# Patient Record
Sex: FEMALE | Race: WHITE | NOT HISPANIC OR LATINO | Employment: OTHER | URBAN - METROPOLITAN AREA
[De-identification: names, ages, dates, MRNs, and addresses within clinical notes are randomized per-mention and may not be internally consistent; named-entity substitution may affect disease eponyms.]

---

## 2023-08-28 PROBLEM — E05.90 SUBCLINICAL HYPERTHYROIDISM: Status: ACTIVE | Noted: 2023-08-28

## 2023-08-28 PROBLEM — M19.90 ARTHRITIS: Status: ACTIVE | Noted: 2023-08-28

## 2023-08-28 PROBLEM — R07.9 CHEST PAIN: Status: ACTIVE | Noted: 2023-08-28

## 2023-08-28 PROBLEM — K21.9 GERD (GASTROESOPHAGEAL REFLUX DISEASE): Status: ACTIVE | Noted: 2023-08-28

## 2023-08-28 PROBLEM — E04.1 NODULAR THYROID DISEASE: Status: ACTIVE | Noted: 2023-08-28

## 2023-08-28 PROBLEM — E66.9 DIABETES MELLITUS TYPE 2 IN OBESE: Status: ACTIVE | Noted: 2023-08-28

## 2023-08-28 PROBLEM — E78.2 MIXED HYPERLIPIDEMIA: Status: ACTIVE | Noted: 2023-08-28

## 2023-08-28 PROBLEM — J44.9 COPD (CHRONIC OBSTRUCTIVE PULMONARY DISEASE) (MULTI): Status: ACTIVE | Noted: 2023-08-28

## 2023-08-28 PROBLEM — I25.10 CORONARY ARTERIOSCLEROSIS: Status: ACTIVE | Noted: 2023-08-28

## 2023-08-28 PROBLEM — E11.69 DIABETES MELLITUS TYPE 2 IN OBESE: Status: ACTIVE | Noted: 2023-08-28

## 2023-08-28 PROBLEM — I10 HYPERTENSIVE DISORDER: Status: ACTIVE | Noted: 2023-08-28

## 2023-08-28 PROBLEM — E11.9 DIABETES (MULTI): Status: ACTIVE | Noted: 2023-08-28

## 2023-08-28 PROBLEM — Z95.5 PRESENCE OF STENT IN CORONARY ARTERY: Status: ACTIVE | Noted: 2023-08-28

## 2023-08-28 RX ORDER — GLIMEPIRIDE 1 MG/1
TABLET ORAL
COMMUNITY
Start: 2023-08-13

## 2023-08-28 RX ORDER — ROSUVASTATIN CALCIUM 40 MG/1
TABLET, COATED ORAL
COMMUNITY
End: 2024-05-16 | Stop reason: SDUPTHER

## 2023-08-28 RX ORDER — METFORMIN HYDROCHLORIDE 500 MG/1
TABLET ORAL
COMMUNITY

## 2023-08-28 RX ORDER — OMEGA-3 FATTY ACIDS/FISH OIL 300-500 MG
CAPSULE ORAL
COMMUNITY

## 2023-08-28 RX ORDER — ASPIRIN 81 MG/1
TABLET ORAL
COMMUNITY

## 2023-08-28 RX ORDER — DILTIAZEM HYDROCHLORIDE 240 MG/1
CAPSULE, COATED, EXTENDED RELEASE ORAL
COMMUNITY

## 2023-08-28 RX ORDER — HYDROCHLOROTHIAZIDE 25 MG/1
TABLET ORAL
COMMUNITY

## 2023-08-28 RX ORDER — ATORVASTATIN CALCIUM 40 MG/1
TABLET, FILM COATED ORAL
COMMUNITY
End: 2024-04-30 | Stop reason: DRUGHIGH

## 2023-08-28 RX ORDER — CLOPIDOGREL BISULFATE 75 MG/1
TABLET ORAL
COMMUNITY

## 2023-08-28 RX ORDER — ISOSORBIDE MONONITRATE 120 MG/1
TABLET, EXTENDED RELEASE ORAL
COMMUNITY
Start: 2023-05-16

## 2023-08-28 RX ORDER — CARVEDILOL 3.12 MG/1
TABLET ORAL
COMMUNITY

## 2023-08-28 RX ORDER — ACETAMINOPHEN 500 MG
TABLET ORAL
COMMUNITY
Start: 2019-03-07

## 2023-08-28 RX ORDER — PANTOPRAZOLE SODIUM 40 MG/1
TABLET, DELAYED RELEASE ORAL
COMMUNITY
Start: 2022-11-04

## 2023-08-28 RX ORDER — TRIAMCINOLONE ACETONIDE 1 MG/G
CREAM TOPICAL
COMMUNITY

## 2023-08-28 RX ORDER — NITROFURANTOIN 25; 75 MG/1; MG/1
CAPSULE ORAL
COMMUNITY

## 2023-08-28 RX ORDER — FUROSEMIDE 20 MG/1
TABLET ORAL
COMMUNITY

## 2023-08-28 RX ORDER — NITROGLYCERIN 0.4 MG/1
TABLET SUBLINGUAL
COMMUNITY
Start: 2023-04-24

## 2023-08-28 RX ORDER — ACETAMINOPHEN 500 MG/1
CAPSULE, LIQUID FILLED ORAL
COMMUNITY

## 2023-08-28 RX ORDER — ATORVASTATIN CALCIUM 80 MG/1
TABLET, FILM COATED ORAL
COMMUNITY

## 2023-08-28 RX ORDER — ATENOLOL 25 MG/1
TABLET ORAL
COMMUNITY

## 2023-08-28 RX ORDER — POTASSIUM CHLORIDE 20 MEQ/1
TABLET, EXTENDED RELEASE ORAL
COMMUNITY
Start: 2023-06-11

## 2023-08-28 RX ORDER — UBIDECARENONE 50 MG
CAPSULE ORAL
COMMUNITY
Start: 2019-03-07

## 2023-08-28 RX ORDER — METHIMAZOLE 5 MG/1
TABLET ORAL
COMMUNITY
End: 2023-12-06

## 2023-08-28 RX ORDER — ALBUTEROL SULFATE 90 UG/1
AEROSOL, METERED RESPIRATORY (INHALATION)
COMMUNITY

## 2023-08-28 RX ORDER — GABAPENTIN 300 MG/1
CAPSULE ORAL
COMMUNITY
Start: 2023-07-24 | End: 2025-04-30

## 2023-08-28 RX ORDER — AA/PROT/LYSINE/METHIO/VIT C/B6 50-12.5 MG
TABLET ORAL
COMMUNITY
Start: 2019-03-07

## 2023-08-28 RX ORDER — FLUTICASONE FUROATE AND VILANTEROL 200; 25 UG/1; UG/1
POWDER RESPIRATORY (INHALATION)
COMMUNITY

## 2023-08-29 ENCOUNTER — HOSPITAL ENCOUNTER (OUTPATIENT)
Dept: DATA CONVERSION | Facility: HOSPITAL | Age: 73
Discharge: HOME | End: 2023-08-29
Payer: MEDICARE

## 2023-08-29 DIAGNOSIS — R82.81 PYURIA: ICD-10-CM

## 2023-08-29 LAB
AMOXICILLIN+CLAV SUSC ISLT: NORMAL
AMOXICILLIN+CLAV SUSC ISLT: NORMAL
AMPICILLIN SUSC ISLT: NORMAL
AMPICILLIN SUSC ISLT: NORMAL
AMPICILLIN+SULBAC SUSC ISLT: NORMAL
AMPICILLIN+SULBAC SUSC ISLT: NORMAL
BACTERIA ISLT: NORMAL
BACTERIA ISLT: NORMAL
BACTERIA SPEC CULT: NORMAL
CC # UR: NORMAL /UL
CEFAZOLIN SUSC ISLT: NORMAL
CEFAZOLIN SUSC ISLT: NORMAL
CIPROFLOXACIN SUSC ISLT: NORMAL
CIPROFLOXACIN SUSC ISLT: NORMAL
GENTAMICIN ISLT MLC: NORMAL
GENTAMICIN ISLT MLC: NORMAL
LEVOFLOXACIN SUSC ISLT: NORMAL
LEVOFLOXACIN SUSC ISLT: NORMAL
MEROPENEM SUSC ISLT: NORMAL
MEROPENEM SUSC ISLT: NORMAL
MIC (SUSCEPTIBILITY): NORMAL
MIC (SUSCEPTIBILITY): NORMAL
NITROFURANTOIN SUSC ISLT: NORMAL
NITROFURANTOIN SUSC ISLT: NORMAL
PIP+TAZO SUSC ISLT: NORMAL
PIP+TAZO SUSC ISLT: NORMAL
REPORT STATUS -LH SQ DATA CONVERSION: NORMAL
SERVICE CMNT-IMP: NORMAL
SPECIMEN SOURCE: NORMAL
TETRACYCLINE SUSC ISLT: NORMAL
TETRACYCLINE SUSC ISLT: NORMAL
TMP SMX SUSC ISLT: NORMAL
TMP SMX SUSC ISLT: NORMAL

## 2023-09-19 ENCOUNTER — HOSPITAL ENCOUNTER (OUTPATIENT)
Dept: DATA CONVERSION | Facility: HOSPITAL | Age: 73
Discharge: HOME | End: 2023-09-19
Payer: MEDICARE

## 2023-09-19 DIAGNOSIS — R82.81 PYURIA: ICD-10-CM

## 2023-09-19 LAB
AMOXICILLIN+CLAV SUSC ISLT: NORMAL
AMPICILLIN SUSC ISLT: NORMAL
AMPICILLIN+SULBAC SUSC ISLT: NORMAL
BACTERIA ISLT: NORMAL
BACTERIA SPEC CULT: NORMAL
CC # UR: NORMAL /UL
CEFAZOLIN SUSC ISLT: NORMAL
CIPROFLOXACIN SUSC ISLT: NORMAL
GENTAMICIN ISLT MLC: NORMAL
LEVOFLOXACIN SUSC ISLT: NORMAL
MEROPENEM SUSC ISLT: NORMAL
MIC (SUSCEPTIBILITY): NORMAL
NITROFURANTOIN SUSC ISLT: NORMAL
PIP+TAZO SUSC ISLT: NORMAL
REPORT STATUS -LH SQ DATA CONVERSION: NORMAL
SERVICE CMNT-IMP: NORMAL
SPECIMEN SOURCE: NORMAL
TETRACYCLINE SUSC ISLT: NORMAL
TMP SMX SUSC ISLT: NORMAL

## 2023-10-16 ENCOUNTER — LAB (OUTPATIENT)
Dept: LAB | Facility: LAB | Age: 73
End: 2023-10-16
Payer: MEDICARE

## 2023-10-16 DIAGNOSIS — E11.8 TYPE 2 DIABETES MELLITUS WITH UNSPECIFIED COMPLICATIONS (MULTI): Primary | ICD-10-CM

## 2023-10-16 LAB
EST. AVERAGE GLUCOSE BLD GHB EST-MCNC: 137 MG/DL
HBA1C MFR BLD: 6.4 %

## 2023-10-16 PROCEDURE — 36415 COLL VENOUS BLD VENIPUNCTURE: CPT

## 2023-10-16 PROCEDURE — 83036 HEMOGLOBIN GLYCOSYLATED A1C: CPT

## 2023-10-17 ENCOUNTER — LAB REQUISITION (OUTPATIENT)
Dept: LAB | Facility: HOSPITAL | Age: 73
End: 2023-10-17
Payer: MEDICARE

## 2023-10-17 DIAGNOSIS — R82.81 PYURIA: ICD-10-CM

## 2023-10-17 PROCEDURE — 87086 URINE CULTURE/COLONY COUNT: CPT

## 2023-10-19 LAB — BACTERIA UR CULT: ABNORMAL

## 2023-10-24 ENCOUNTER — ANCILLARY PROCEDURE (OUTPATIENT)
Dept: RADIOLOGY | Facility: CLINIC | Age: 73
End: 2023-10-24
Payer: MEDICARE

## 2023-10-24 ENCOUNTER — LAB (OUTPATIENT)
Dept: LAB | Facility: LAB | Age: 73
End: 2023-10-24
Payer: MEDICARE

## 2023-10-24 DIAGNOSIS — E04.1 NONTOXIC SINGLE THYROID NODULE: ICD-10-CM

## 2023-10-24 DIAGNOSIS — E04.1 NONTOXIC SINGLE THYROID NODULE: Primary | ICD-10-CM

## 2023-10-24 LAB — TSH SERPL DL<=0.05 MIU/L-ACNC: 0.57 MIU/L (ref 0.27–4.2)

## 2023-10-24 PROCEDURE — 76536 US EXAM OF HEAD AND NECK: CPT

## 2023-10-24 PROCEDURE — 36415 COLL VENOUS BLD VENIPUNCTURE: CPT

## 2023-10-24 PROCEDURE — 84443 ASSAY THYROID STIM HORMONE: CPT

## 2023-10-31 NOTE — PROGRESS NOTES
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         April 2022 thyroid u/s: R>L         -R 2.7cm complex nodule         -R 1.2cm complex nodule         - R 1.8cm comples nodule         -L 0.7cm solid nodule         -L 1.4cm solid nodule         -TSH-0.01, ft4-1.4 in April 2022                 Since last visit:         -taking methimazole 5mg qd, thyroid labs tsh-0.57         -pt euthyroid         -no obstructive goiter sx          Thyroid ultrasound oct 2023:  -R 2.2, R 1.3 (similar), L sub cm nodules, isthmus 1.1cm  -nodules graded TR2      Current Outpatient Medications:     acetaminophen (Tylenol) 500 mg capsule, 1 capsule as needed Orally every 8 hours, Disp: , Rfl:     albuterol (Proventil HFA) 90 mcg/actuation inhaler, 2 puffs Inhaler every 4 hours, Disp: , Rfl:     aspirin 81 mg EC tablet, 1 tablet Orally Once a day, Disp: , Rfl:     atorvastatin (Lipitor) 80 mg tablet, 1 tablet Orally Once a day for 30 day(s), Disp: , Rfl:     carvedilol (Coreg) 3.125 mg tablet, 1 tablet with food Orally Twice a day, Disp: , Rfl:     cetirizine HCl (ZYRTEC ORAL), Zyrtec, Disp: , Rfl:     clopidogrel (Plavix) 75 mg tablet, 1 tablet Orally Once a day, Disp: , Rfl:     coenzyme Q-10 10 mg capsule, TAKE 1 CAPSULE 2-3 TIMES DAILY AS DIRECTED., Disp: , Rfl:     dilTIAZem CD (Cartia XT) 240 mg 24 hr capsule, 1 capsule Orally Once a day for 90, Disp: , Rfl:     glimepiride (Amaryl) 1 mg tablet, TAKE 1 TABLET BY MOUTH EVERY DAY WITH BREAKFAST, Disp: , Rfl:     hydroCHLOROthiazide (HYDRODiuril) 25 mg tablet, 1 tablet in the morning Orally Once a day for 90, Disp: , Rfl:     isosorbide mononitrate ER (Imdur) 120 mg 24 hr tablet, 1 tablet in the morning Orally Once a day for 90, Disp: , Rfl:     metFORMIN (Glucophage) 500 mg tablet, 1 tablet with meals Orally Twice a day, Disp: , Rfl:     nitroglycerin (Nitrostat) 0.4 mg SL tablet, 1  tablet Sublingual prn for chest pain for 30 days, Disp: , Rfl:     omega-3 fatty acids-fish oil (Fish OiL) 300-500 mg capsule, 1 capsule Orally daily, Disp: , Rfl:     pantoprazole (ProtoNix) 40 mg EC tablet, 1 tablet Orally Once a day for 30 day(s), Disp: , Rfl:     potassium chloride CR (Klor-Con M20) 20 mEq ER tablet, TAKE 1 TABLET BY MOUTH TWICE A DAY, Disp: , Rfl:     atenolol (Tenormin) 25 mg tablet, TAKE 1 TABLET DAILY., Disp: , Rfl:     atorvastatin (Lipitor) 40 mg tablet, TAKE 1 TABLET AT BEDTIME., Disp: , Rfl:     calcium carbonate-vitamin D3 (Caltrate with Vitamin D3) 600 mg-20 mcg (800 unit) tablet, Take 1 tablet daily, Disp: , Rfl:     calcium carbonate/vitamin D2 (CALCIUM CARBONATE-VITAMIN D PO), Calcium Carbonate-Vitamin D, Disp: , Rfl:     empagliflozin (Jardiance) 10 mg, 1 tablet Orally Once a day for 30 day(s), Disp: , Rfl:     fexofenadine HCl (ALLEGRA ORAL), TAKE 1 CAPSULE TWICE DAILY AS NEEDED., Disp: , Rfl:     fluticasone furoate-vilanteroL (Breo Ellipta) 200-25 mcg/dose inhaler, Inhalation Once a day, Disp: , Rfl:     fluticasone propionate (FLONASE NASL), Flonase, Disp: , Rfl:     furosemide (Lasix) 20 mg tablet, TAKE 1 TABLET DAILY., Disp: , Rfl:     gabapentin (Neurontin) 300 mg capsule, Take 1 capsule by mouth three times daily for 90 days., Disp: , Rfl:     methIMAzole (Tapazole) 5 mg tablet, TAKE 1 TABLET BY MOUTH EVERY DAY for 90 days, Disp: , Rfl:     nitrofurantoin, macrocrystal-monohydrate, (Macrobid) 100 mg capsule, 1 capsule with food Orally every 12 hrs, Disp: , Rfl:     NUTRITIONAL SUPPLEMENTS ORAL, Azo- taking twice a day, Disp: , Rfl:     red yeast rice 600 mg tablet, TAKE TABLET, Disp: , Rfl:     rosuvastatin (Crestor) 40 mg tablet, TAKE 1 TABLET BY MOUTH EVERY DAY FOR 90 DAYS for 90, Disp: , Rfl:     triamcinolone (Kenalog) 0.1 % cream, Kenalog Cream 0.1%, Disp: , Rfl:    Review of Systems:  Cardiology: Lightheadedness-denies.  Chest pain-denies.  Leg edema-denies.   Palpitations-denies.  Respiratory: Cough-denies. Shortness of breath-chronic.  Wheezing-denies.  Gastroenterology: Constipation-denies.  Diarrhea-denies.  Heartburn-denies.  Endocrinology: Cold intolerance-positive.  Heat intolerance-positive.  Sweats-denies.  Neurology: Headache-denies.  Tremor-denies.  Neuropathy in extremities-denies.  Psychology: Low energy-positive.  Irritability-denies.  Sleep disturbances-denies.    Labs: see hpi    /67   Pulse 84   Wt 67.4 kg (148 lb 9.6 oz)   BMI 25.51 kg/m²       Physical Exam:  General Appearance: pleasant, cooperative, no acute distress  HEENT: no chemosis, no proptosis, no lid lag, no lid retraction  Neck: no lymphadenopathy, no thyromegaly, no dominant thyroid nodules  Heart: no murmurs, regular rate and rhythm, S1 and S2  Lungs: no wheezes, no rhonci, no rales  Extremities: no lower extremity swelling    Assessment and Plan:  1. Subclinical hyperthyroidism  -continue methimazole 5mg every day long term  -follow labs q 6 months  2. Nodular thyroid  -fairly stable on serial ultrasound, no concerning looking nodules  -follow with exam, reflex to ultrasound if exam changes      Follow Up:  Jonna 6 months    -labs/tests/notes reviewed  -reviewed and counseled patient on medication monitoring and side effects

## 2023-11-02 ENCOUNTER — OFFICE VISIT (OUTPATIENT)
Dept: ENDOCRINOLOGY | Facility: CLINIC | Age: 73
End: 2023-11-02
Payer: MEDICARE

## 2023-11-02 VITALS
DIASTOLIC BLOOD PRESSURE: 67 MMHG | HEART RATE: 84 BPM | BODY MASS INDEX: 25.51 KG/M2 | SYSTOLIC BLOOD PRESSURE: 122 MMHG | WEIGHT: 148.6 LBS

## 2023-11-02 DIAGNOSIS — E05.90 SUBCLINICAL HYPERTHYROIDISM: Primary | ICD-10-CM

## 2023-11-02 PROCEDURE — 3044F HG A1C LEVEL LT 7.0%: CPT | Performed by: INTERNAL MEDICINE

## 2023-11-02 PROCEDURE — 1036F TOBACCO NON-USER: CPT | Performed by: INTERNAL MEDICINE

## 2023-11-02 PROCEDURE — 3074F SYST BP LT 130 MM HG: CPT | Performed by: INTERNAL MEDICINE

## 2023-11-02 PROCEDURE — 1125F AMNT PAIN NOTED PAIN PRSNT: CPT | Performed by: INTERNAL MEDICINE

## 2023-11-02 PROCEDURE — 1159F MED LIST DOCD IN RCRD: CPT | Performed by: INTERNAL MEDICINE

## 2023-11-02 PROCEDURE — 3078F DIAST BP <80 MM HG: CPT | Performed by: INTERNAL MEDICINE

## 2023-11-02 PROCEDURE — 99213 OFFICE O/P EST LOW 20 MIN: CPT | Performed by: INTERNAL MEDICINE

## 2023-11-02 ASSESSMENT — PATIENT HEALTH QUESTIONNAIRE - PHQ9
2. FEELING DOWN, DEPRESSED OR HOPELESS: NOT AT ALL
1. LITTLE INTEREST OR PLEASURE IN DOING THINGS: NOT AT ALL
SUM OF ALL RESPONSES TO PHQ9 QUESTIONS 1 & 2: 0

## 2023-11-02 ASSESSMENT — ENCOUNTER SYMPTOMS
OCCASIONAL FEELINGS OF UNSTEADINESS: 0
LOSS OF SENSATION IN FEET: 0
DEPRESSION: 0

## 2023-11-02 ASSESSMENT — PAIN SCALES - GENERAL: PAINLEVEL: 1

## 2023-11-03 ENCOUNTER — ANCILLARY PROCEDURE (OUTPATIENT)
Dept: RADIOLOGY | Facility: CLINIC | Age: 73
End: 2023-11-03
Payer: MEDICARE

## 2023-11-03 VITALS — BODY MASS INDEX: 27.42 KG/M2 | HEIGHT: 62 IN | WEIGHT: 149 LBS

## 2023-11-03 DIAGNOSIS — Z12.31 ENCOUNTER FOR SCREENING MAMMOGRAM FOR MALIGNANT NEOPLASM OF BREAST: ICD-10-CM

## 2023-11-03 PROCEDURE — 77063 BREAST TOMOSYNTHESIS BI: CPT

## 2023-11-16 ENCOUNTER — LAB (OUTPATIENT)
Dept: LAB | Facility: LAB | Age: 73
End: 2023-11-16
Payer: MEDICARE

## 2023-11-16 DIAGNOSIS — E11.8 TYPE 2 DIABETES MELLITUS WITH UNSPECIFIED COMPLICATIONS (MULTI): Primary | ICD-10-CM

## 2023-11-16 LAB
ANION GAP SERPL CALC-SCNC: 17 MMOL/L
BUN SERPL-MCNC: 14 MG/DL (ref 8–25)
CALCIUM SERPL-MCNC: 9.4 MG/DL (ref 8.5–10.4)
CHLORIDE SERPL-SCNC: 102 MMOL/L (ref 97–107)
CO2 SERPL-SCNC: 26 MMOL/L (ref 24–31)
CREAT SERPL-MCNC: 0.7 MG/DL (ref 0.4–1.6)
ERYTHROCYTE [DISTWIDTH] IN BLOOD BY AUTOMATED COUNT: 18.1 % (ref 11.5–14.5)
EST. AVERAGE GLUCOSE BLD GHB EST-MCNC: 126 MG/DL
GFR SERPL CREATININE-BSD FRML MDRD: >90 ML/MIN/1.73M*2
GLUCOSE SERPL-MCNC: 115 MG/DL (ref 65–99)
HBA1C MFR BLD: 6 %
HCT VFR BLD AUTO: 37.8 % (ref 36–46)
HGB BLD-MCNC: 11.1 G/DL (ref 12–16)
MCH RBC QN AUTO: 24.3 PG (ref 26–34)
MCHC RBC AUTO-ENTMCNC: 29.4 G/DL (ref 32–36)
MCV RBC AUTO: 83 FL (ref 80–100)
NRBC BLD-RTO: 0 /100 WBCS (ref 0–0)
PLATELET # BLD AUTO: 333 X10*3/UL (ref 150–450)
POTASSIUM SERPL-SCNC: 3.5 MMOL/L (ref 3.4–5.1)
RBC # BLD AUTO: 4.56 X10*6/UL (ref 4–5.2)
SODIUM SERPL-SCNC: 145 MMOL/L (ref 133–145)
WBC # BLD AUTO: 9.2 X10*3/UL (ref 4.4–11.3)

## 2023-11-16 PROCEDURE — 80048 BASIC METABOLIC PNL TOTAL CA: CPT

## 2023-11-16 PROCEDURE — 83036 HEMOGLOBIN GLYCOSYLATED A1C: CPT

## 2023-11-16 PROCEDURE — 36415 COLL VENOUS BLD VENIPUNCTURE: CPT

## 2023-11-16 PROCEDURE — 85027 COMPLETE CBC AUTOMATED: CPT

## 2023-12-06 DIAGNOSIS — E04.1 NONTOXIC SINGLE THYROID NODULE: ICD-10-CM

## 2023-12-06 RX ORDER — METHIMAZOLE 5 MG/1
5 TABLET ORAL DAILY
Qty: 90 TABLET | Refills: 1 | Status: SHIPPED | OUTPATIENT
Start: 2023-12-06 | End: 2024-05-31

## 2023-12-11 PROBLEM — I73.9 PAD (PERIPHERAL ARTERY DISEASE) (CMS-HCC): Status: ACTIVE | Noted: 2018-01-15

## 2023-12-11 PROBLEM — E78.5 HYPERLIPIDEMIA: Status: ACTIVE | Noted: 2023-08-28

## 2023-12-11 PROBLEM — I25.10 CORONARY ARTERY DISEASE INVOLVING NATIVE CORONARY ARTERY OF NATIVE HEART WITHOUT ANGINA PECTORIS: Status: ACTIVE | Noted: 2018-10-11

## 2023-12-11 PROBLEM — R00.2 PALPITATIONS: Status: ACTIVE | Noted: 2020-08-20

## 2023-12-11 PROBLEM — E78.1 PURE HYPERGLYCERIDEMIA: Status: ACTIVE | Noted: 2019-04-01

## 2023-12-11 PROBLEM — Z95.9 S/P ARTERIAL STENT: Status: ACTIVE | Noted: 2018-03-01

## 2023-12-11 PROBLEM — I82.409 DVT (DEEP VENOUS THROMBOSIS) (MULTI): Status: ACTIVE | Noted: 2019-08-23

## 2023-12-11 PROBLEM — R06.02 SOB (SHORTNESS OF BREATH): Status: ACTIVE | Noted: 2018-01-08

## 2023-12-11 PROBLEM — R79.89 ABNORMAL TSH: Status: ACTIVE | Noted: 2018-09-20

## 2023-12-11 PROBLEM — K22.70 BARRETT'S ESOPHAGUS WITHOUT DYSPLASIA: Status: ACTIVE | Noted: 2017-12-27

## 2023-12-11 PROBLEM — E78.00 PURE HYPERCHOLESTEROLEMIA: Status: ACTIVE | Noted: 2019-08-23

## 2023-12-11 PROBLEM — L43.9 LICHEN PLANUS: Status: ACTIVE | Noted: 2018-10-04

## 2023-12-11 PROBLEM — E11.9 DIABETES MELLITUS TYPE 2, CONTROLLED, WITHOUT COMPLICATIONS (MULTI): Status: ACTIVE | Noted: 2023-08-28

## 2023-12-11 PROBLEM — M53.9 BACK PROBLEM: Status: ACTIVE | Noted: 2019-08-23

## 2023-12-11 PROBLEM — J44.9 STAGE 1 MILD COPD BY GOLD CLASSIFICATION (MULTI): Status: ACTIVE | Noted: 2019-08-23

## 2023-12-11 PROBLEM — I20.89 OTHER FORMS OF ANGINA PECTORIS (CMS-HCC): Status: ACTIVE | Noted: 2020-04-15

## 2023-12-11 PROBLEM — E11.8 DM TYPE 2, CONTROLLED, WITH COMPLICATION (MULTI): Status: ACTIVE | Noted: 2020-04-02

## 2023-12-11 PROBLEM — I65.29 CAROTID STENOSIS: Status: ACTIVE | Noted: 2018-08-14

## 2023-12-11 PROBLEM — E11.8 DM TYPE 2, CONTROLLED, WITH COMPLICATION (MULTI): Status: ACTIVE | Noted: 2019-08-23

## 2023-12-11 PROBLEM — R33.9 INCOMPLETE EMPTYING OF BLADDER: Status: ACTIVE | Noted: 2023-10-23

## 2023-12-11 RX ORDER — TAMSULOSIN HYDROCHLORIDE 0.4 MG/1
0.4 CAPSULE ORAL DAILY
COMMUNITY

## 2023-12-11 RX ORDER — LINEZOLID 600 MG/1
600 TABLET, FILM COATED ORAL 2 TIMES DAILY
COMMUNITY
Start: 2023-07-24 | End: 2023-07-31

## 2023-12-11 RX ORDER — BUDESONIDE, GLYCOPYRROLATE, AND FORMOTEROL FUMARATE 160; 9; 4.8 UG/1; UG/1; UG/1
2 AEROSOL, METERED RESPIRATORY (INHALATION) 2 TIMES DAILY
COMMUNITY
Start: 2023-10-23

## 2023-12-11 RX ORDER — CALCIUM CARBONATE/SIMETHICONE 1000-60 MG
1 TABLET,CHEWABLE ORAL EVERY 6 HOURS PRN
COMMUNITY
Start: 2023-01-10

## 2023-12-13 ENCOUNTER — OFFICE VISIT (OUTPATIENT)
Dept: CARDIOLOGY | Facility: CLINIC | Age: 73
End: 2023-12-13
Payer: MEDICARE

## 2023-12-13 VITALS
HEIGHT: 64 IN | DIASTOLIC BLOOD PRESSURE: 68 MMHG | OXYGEN SATURATION: 97 % | WEIGHT: 150.6 LBS | SYSTOLIC BLOOD PRESSURE: 129 MMHG | HEART RATE: 76 BPM | TEMPERATURE: 98.9 F | BODY MASS INDEX: 25.71 KG/M2 | RESPIRATION RATE: 18 BRPM

## 2023-12-13 DIAGNOSIS — E78.2 MIXED HYPERLIPIDEMIA: ICD-10-CM

## 2023-12-13 DIAGNOSIS — R07.9 CHEST PAIN IN ADULT: Primary | ICD-10-CM

## 2023-12-13 DIAGNOSIS — I10 ESSENTIAL HYPERTENSION: ICD-10-CM

## 2023-12-13 DIAGNOSIS — I25.10 CORONARY ARTERY DISEASE INVOLVING NATIVE CORONARY ARTERY OF NATIVE HEART WITHOUT ANGINA PECTORIS: ICD-10-CM

## 2023-12-13 DIAGNOSIS — R00.2 PALPITATIONS: ICD-10-CM

## 2023-12-13 PROCEDURE — 1126F AMNT PAIN NOTED NONE PRSNT: CPT | Performed by: INTERNAL MEDICINE

## 2023-12-13 PROCEDURE — 3074F SYST BP LT 130 MM HG: CPT | Performed by: INTERNAL MEDICINE

## 2023-12-13 PROCEDURE — 3044F HG A1C LEVEL LT 7.0%: CPT | Performed by: INTERNAL MEDICINE

## 2023-12-13 PROCEDURE — 1159F MED LIST DOCD IN RCRD: CPT | Performed by: INTERNAL MEDICINE

## 2023-12-13 PROCEDURE — 3078F DIAST BP <80 MM HG: CPT | Performed by: INTERNAL MEDICINE

## 2023-12-13 PROCEDURE — 99214 OFFICE O/P EST MOD 30 MIN: CPT | Performed by: INTERNAL MEDICINE

## 2023-12-13 PROCEDURE — 1036F TOBACCO NON-USER: CPT | Performed by: INTERNAL MEDICINE

## 2023-12-13 RX ORDER — REGADENOSON 0.08 MG/ML
0.4 INJECTION, SOLUTION INTRAVENOUS
Status: CANCELLED | OUTPATIENT
Start: 2023-12-13

## 2023-12-13 ASSESSMENT — PAIN SCALES - GENERAL: PAINLEVEL: 0-NO PAIN

## 2023-12-13 ASSESSMENT — PATIENT HEALTH QUESTIONNAIRE - PHQ9
2. FEELING DOWN, DEPRESSED OR HOPELESS: NOT AT ALL
SUM OF ALL RESPONSES TO PHQ9 QUESTIONS 1 AND 2: 0
1. LITTLE INTEREST OR PLEASURE IN DOING THINGS: NOT AT ALL

## 2023-12-13 ASSESSMENT — ENCOUNTER SYMPTOMS
OCCASIONAL FEELINGS OF UNSTEADINESS: 0
DEPRESSION: 0
LOSS OF SENSATION IN FEET: 0

## 2023-12-13 NOTE — PROGRESS NOTES
History of present illness:  73 year old female patient here today following up on hx of CAD status post PCI to RAMUS and bifurcation, LCX complicated by dissection of one branch, small NSTEMI, she had failed  of RCA at . (CCS Class II and NYHA Class II-III). MCT monitor was overall normal with no major arrhythmias. Cardiac catheterization on 12/22/2020 status post PCI to LCX with WENDY. 2D Echo on 01/11/2021 showed mild MR with EF of 50-55%. Patient was hospitalized in December 2022 with shortness of breath secondary chronic obstructive pulmonary disease exacerbation. Was found to have pulmonary emphysema with moderate obstructive lung disease. Her workup from cardiac standpoint was stable with normal troponin. 2D echo showed ejection fraction of 50% with mild aortic stenosis in December 2022.  Patient returns to my office for follow-up.  Still complaining of shortness of breath most of the time even at rest.  Reports occasional chest pain.  Happening randomly not always related to activity.  Patient denies having lower extremity edema orthopnea or PND.    Past Medical History:   Diagnosis Date    Carotid stenosis 08/14/2018    Formatting of this note might be different from the original. Per [facility] office visit 8-13-18    Coronary artery disease involving native coronary artery of native heart without angina pectoris 10/11/2018    Diabetes mellitus type 2, controlled, without complications (CMS/[facility]) 08/28/2023    DVT (deep venous thrombosis) (CMS/[facility]) 08/23/2019    Formatting of this note might be different from the original. H/o DVT 2008 per [facility] office visit 8-19-19    Essential hypertension 08/28/2023    Hyperlipidemia 08/28/2023    Other forms of angina pectoris 04/15/2020    Formatting of this note might be different from the original. Per Dr. Blank office visit 4-6-20    PAD (peripheral artery disease) (CMS/[facility]) 01/15/2018    Formatting of this note might be different from the original. Per [facility] office  visit 8-19-19    Palpitations 08/20/2020    Formatting of this note might be different from the original. Per Dr. Blank office visit 8-12-20    Personal history of other diseases of the circulatory system 03/11/2019    History of peripheral arterial disease    Personal history of other endocrine, nutritional and metabolic disease 03/11/2019    History of diabetes mellitus    Personal history of transient ischemic attack (TIA), and cerebral infarction without residual deficits 02/19/2019    History of cerebrovascular accident    Presence of stent in coronary artery 08/28/2023    Pure hypercholesterolemia 08/23/2019    Formatting of this note might be different from the original. Per [facility] office visit 8-19-19    Pure hyperglyceridemia 04/01/2019    SOB (shortness of breath) 01/08/2018    Formatting of this note might be different from the original. Will schedule Lexiscan Myoview to evaluate CAD and angina symptoms. (patient unable to perform exercise myoview due to SOB) per Dr. Blank office visit 12/19/17    Stage 1 mild COPD by GOLD classification (CMS/HCC) 08/23/2019    Formatting of this note might be different from the original. Per [facility] office visit 8-19-19       Past Surgical History:   Procedure Laterality Date    BI STEREOTACTIC GUIDED BREAST LEFT LOCALIZATION AND BIOPSY Left 12/16/2020    BI STEREOTACTIC GUIDED BREAST LOCALIZATION AND BIOPSY LEFT [facility]    BI US GUIDED BREAST LOCALIZATION AND BIOPSY LEFT Left 11/17/2016    BI US GUIDED BREAST LOCALIZATION AND BIOPSY LEFT [facility]    BREAST BIOPSY Left     OTHER SURGICAL HISTORY  02/19/2019    Carpal tunnel surgery    OTHER SURGICAL HISTORY  02/19/2019    Back surgery    OTHER SURGICAL HISTORY  02/19/2019    [place] filter placement    OTHER SURGICAL HISTORY  02/19/2019    Elbow surgery    OTHER SURGICAL HISTORY  02/19/2019    Internal carotid thromboendarterectomy    OTHER SURGICAL HISTORY  02/19/2019    Arterial stent placement        Allergies   Allergen Reactions    Metoprolol Shortness of breath and Unknown    Other Other    Albumin Products Unknown    Hydrochlorothiazide Unknown    Latex Unknown and Hives    Nicotine Unknown and Hives     Nicotine patches    Pollen Extracts Other     Congestion, itchy eyes, runny nose    Adhesive Tape-Silicones Hives    Flu Vaccine 2012-13(3 Yr+)(Pf) Other    Valsartan Other        reports that she quit smoking about 15 years ago. Her smoking use included cigarettes. She has never been exposed to tobacco smoke. She has never used smokeless tobacco. Drug use questions deferred to the physician. She reports that she does not drink alcohol.    Family History   Problem Relation Name Age of Onset    Emphysema Mother      Heart disease Mother      Stroke Mother      Asthma Mother      Brain cancer Father      Liver cancer Father      Skin cancer Father      Lung cancer Father      Cancer Father      Hypertension Brother      Diabetes Brother      Watts's esophagus Brother      No Known Problems Brother      Heart disease Maternal Grandmother      Cancer Maternal Grandfather         Patient's Medications   New Prescriptions    No medications on file   Previous Medications    ACETAMINOPHEN (TYLENOL) 500 MG CAPSULE    1 capsule as needed Orally every 8 hours    ALBUTEROL (PROVENTIL HFA) 90 MCG/ACTUATION INHALER    2 puffs Inhaler every 4 hours    ASPIRIN 81 MG EC TABLET    1 tablet Orally Once a day    ATENOLOL (TENORMIN) 25 MG TABLET    TAKE 1 TABLET DAILY.    ATORVASTATIN (LIPITOR) 40 MG TABLET    TAKE 1 TABLET AT BEDTIME.    ATORVASTATIN (LIPITOR) 80 MG TABLET    1 tablet Orally Once a day for 30 day(s)    BUDESONIDE-GLYCOPYR-FORMOTEROL (BREZTRI AEROSPHERE) 160-9-4.8 MCG/ACTUATION HFA AEROSOL INHALER    Inhale 2 puffs twice a day.    CALCIUM CARBONATE-SIMETHICONE (MAALOX ADVANCED) 1,000-60 MG TABLET,CHEWABLE    Chew 1 tablet every 6 hours if needed.    CALCIUM CARBONATE-VITAMIN D3 (CALTRATE WITH VITAMIN D3)  600 MG-20 MCG (800 UNIT) TABLET    Take 1 tablet daily    CALCIUM CARBONATE/VITAMIN D2 (CALCIUM CARBONATE-VITAMIN D PO)    Calcium Carbonate-Vitamin D    CARVEDILOL (COREG) 3.125 MG TABLET    1 tablet with food Orally Twice a day    CETIRIZINE HCL (ZYRTEC ORAL)    Zyrtec    CLOPIDOGREL (PLAVIX) 75 MG TABLET    1 tablet Orally Once a day    COENZYME Q-10 10 MG CAPSULE    TAKE 1 CAPSULE 2-3 TIMES DAILY AS DIRECTED.    DILTIAZEM CD (CARTIA XT) 240 MG 24 HR CAPSULE    1 capsule Orally Once a day for 90    EMPAGLIFLOZIN (JARDIANCE) 10 MG    1 tablet Orally Once a day for 30 day(s)    FEXOFENADINE HCL (ALLEGRA ORAL)    TAKE 1 CAPSULE TWICE DAILY AS NEEDED.    FLUTICASONE FUROATE-VILANTEROL (BREO ELLIPTA) 200-25 MCG/DOSE INHALER    Inhalation Once a day    FLUTICASONE PROPIONATE (FLONASE NASL)    Flonase    FUROSEMIDE (LASIX) 20 MG TABLET    TAKE 1 TABLET DAILY.    GABAPENTIN (NEURONTIN) 300 MG CAPSULE    Take 1 capsule by mouth three times daily for 90 days.    GLIMEPIRIDE (AMARYL) 1 MG TABLET    TAKE 1 TABLET BY MOUTH EVERY DAY WITH BREAKFAST    HYDROCHLOROTHIAZIDE (HYDRODIURIL) 25 MG TABLET    1 tablet in the morning Orally Once a day for 90    ISOSORBIDE MONONITRATE ER (IMDUR) 120 MG 24 HR TABLET    1 tablet in the morning Orally Once a day for 90    METFORMIN (GLUCOPHAGE) 500 MG TABLET    1 tablet with meals Orally Twice a day    METHIMAZOLE (TAPAZOLE) 5 MG TABLET    TAKE 1 TABLET BY MOUTH EVERY DAY    NITROFURANTOIN, MACROCRYSTAL-MONOHYDRATE, (MACROBID) 100 MG CAPSULE    1 capsule with food Orally every 12 hrs    NITROGLYCERIN (NITROSTAT) 0.4 MG SL TABLET    1 tablet Sublingual prn for chest pain for 30 days    NUTRITIONAL SUPPLEMENTS ORAL    Azo- taking twice a day    OMEGA-3 FATTY ACIDS-FISH OIL (FISH OIL) 300-500 MG CAPSULE    1 capsule Orally daily    PANTOPRAZOLE (PROTONIX) 40 MG EC TABLET    1 tablet Orally Once a day for 30 day(s)    POTASSIUM CHLORIDE CR (KLOR-CON M20) 20 MEQ ER TABLET    TAKE 1 TABLET BY  MOUTH TWICE A DAY    RED YEAST RICE 600 MG TABLET    TAKE TABLET    ROSUVASTATIN (CRESTOR) 40 MG TABLET    TAKE 1 TABLET BY MOUTH EVERY DAY FOR 90 DAYS for 90    TAMSULOSIN (FLOMAX) 0.4 MG 24 HR CAPSULE    Take 1 capsule (0.4 mg) by mouth once daily.    TRIAMCINOLONE (KENALOG) 0.1 % CREAM    Kenalog Cream 0.1%   Modified Medications    No medications on file   Discontinued Medications    No medications on file       Objective   Physical Exam  General: Patient in no acute distress   HEENT: Atraumatic normocephalic.  Neck: Supple, jugular venous pressure within normal limit.  No bruits  Lungs: Clear to auscultation bilaterally  Cardiovascular: Regular rate and rhythm, normal heart sounds, no murmurs rubs or gallops  Abdomen: Soft nontender nondistended.  Normal bowel sounds.  Extremities: Warm to touch, no edema.    Lab Review   Lab on 11/16/2023   Component Date Value    Glucose 11/16/2023 115 (H)     Sodium 11/16/2023 145     Potassium 11/16/2023 3.5     Chloride 11/16/2023 102     Bicarbonate 11/16/2023 26     Urea Nitrogen 11/16/2023 14     Creatinine 11/16/2023 0.70     eGFR 11/16/2023 >90     Calcium 11/16/2023 9.4     Anion Gap 11/16/2023 17     WBC 11/16/2023 9.2     nRBC 11/16/2023 0.0     RBC 11/16/2023 4.56     Hemoglobin 11/16/2023 11.1 (L)     Hematocrit 11/16/2023 37.8     MCV 11/16/2023 83     MCH 11/16/2023 24.3 (L)     MCHC 11/16/2023 29.4 (L)     RDW 11/16/2023 18.1 (H)     Platelets 11/16/2023 333     Hemoglobin A1C 11/16/2023 6.0 (H)     Estimated Average Glucose 11/16/2023 126    Lab on 10/24/2023   Component Date Value    Thyroid Stimulating Horm* 10/24/2023 0.57    Lab Requisition on 10/17/2023   Component Date Value    Urine Culture 10/17/2023 Multiple organisms present, probable contamination. Repeat culture if clinically indicated. (A)    Lab on 10/16/2023   Component Date Value    Hemoglobin A1C 10/16/2023 6.4 (H)     Estimated Average Glucose 10/16/2023 137         Assessment/Plan   Patient  Active Problem List   Diagnosis    Arthritis    Coronary artery disease involving native coronary artery of native heart without angina pectoris    Other forms of angina pectoris    Stage 1 mild COPD by GOLD classification (CMS/HCC)    Diabetes (CMS/HCC)    Diabetes mellitus type 2, controlled, without complications (CMS/HCC)    Essential hypertension    GERD (gastroesophageal reflux disease)    Hyperlipidemia    Nodular thyroid disease    Presence of stent in coronary artery    Subclinical hyperthyroidism    Abnormal TSH    Watts's esophagus without dysplasia    DM type 2, controlled, with complication (CMS/HCC)    DM type 2, controlled, with complication (CMS/HCC)    Carotid stenosis    DVT (deep venous thrombosis) (CMS/HCC)    Eczema    S/P arterial stent    Incomplete emptying of bladder    Lichen planus    PAD (peripheral artery disease) (CMS/HCC)    Palpitations    Primary osteoarthritis of both hips    Pure hypercholesterolemia    Pure hyperglyceridemia    SOB (shortness of breath)    Back problem      73 year old female patient here today following up on hx of CAD status post PCI to RAMUS and bifurcation, LCX complicated by dissection of one branch, small NSTEMI, she had failed  of RCA at . (CCS Class II and NYHA Class II-III). MCT monitor was overall normal with no major arrhythmias. Cardiac catheterization on 12/22/2020 status post PCI to LCX with WENDY. 2D Echo on 01/11/2021 showed mild MR with EF of 50-55%. Patient was hospitalized in December 2022 with shortness of breath secondary chronic obstructive pulmonary disease exacerbation. Was found to have pulmonary emphysema with moderate obstructive lung disease. Her workup from cardiac standpoint was stable with normal troponin. 2D echo showed ejection fraction of 50% with mild aortic stenosis in December 2022.  Patient returns to my office for follow-up.  Still complaining of shortness of breath most of the time even at rest.  Reports occasional  chest pain.  Happening randomly not always related to activity.  Patient denies having lower extremity edema orthopnea or PND.  Appears euvolemic on physical examination.  Her shortness of breath could be related to her underlying COPD she does use inhalers but not frequently.  However I am concerned about her history chest pains no recent ischemic workup we will arrange for Lexiscan nuclear stress test.  Patient cannot exercise on treadmill due to osteoarthritis of the knees as well as underlying shortness of breath.  Will continue current medications.  Will follow-up in 3 months.    Soledad Blank MD

## 2024-03-18 ENCOUNTER — TELEPHONE (OUTPATIENT)
Dept: ENDOCRINOLOGY | Facility: CLINIC | Age: 74
End: 2024-03-18
Payer: MEDICARE

## 2024-03-18 DIAGNOSIS — E05.90 SUBCLINICAL HYPERTHYROIDISM: Primary | ICD-10-CM

## 2024-03-18 NOTE — TELEPHONE ENCOUNTER
Maia Gunderson   1950   [id number]   [phone]       Patient called and wanted to know if you want her to get labs drawn before scheduled appt. If so can you please place orders in Epic.

## 2024-04-17 ENCOUNTER — HOSPITAL ENCOUNTER (OUTPATIENT)
Dept: RADIOLOGY | Facility: HOSPITAL | Age: 74
Discharge: HOME | End: 2024-04-17
Payer: MEDICARE

## 2024-04-17 ENCOUNTER — HOSPITAL ENCOUNTER (OUTPATIENT)
Dept: CARDIOLOGY | Facility: HOSPITAL | Age: 74
Discharge: HOME | End: 2024-04-17
Payer: MEDICARE

## 2024-04-17 ENCOUNTER — LAB (OUTPATIENT)
Dept: LAB | Facility: LAB | Age: 74
End: 2024-04-17
Payer: MEDICARE

## 2024-04-17 DIAGNOSIS — R25.1 TREMOR, UNSPECIFIED: ICD-10-CM

## 2024-04-17 DIAGNOSIS — E11.8 TYPE 2 DIABETES MELLITUS WITH UNSPECIFIED COMPLICATIONS (MULTI): ICD-10-CM

## 2024-04-17 DIAGNOSIS — D64.9 ANEMIA, UNSPECIFIED: Primary | ICD-10-CM

## 2024-04-17 DIAGNOSIS — R07.9 CHEST PAIN IN ADULT: ICD-10-CM

## 2024-04-17 DIAGNOSIS — E05.90 SUBCLINICAL HYPERTHYROIDISM: ICD-10-CM

## 2024-04-17 LAB
ERYTHROCYTE [DISTWIDTH] IN BLOOD BY AUTOMATED COUNT: 18 % (ref 11.5–14.5)
EST. AVERAGE GLUCOSE BLD GHB EST-MCNC: 146 MG/DL
HBA1C MFR BLD: 6.7 %
HCT VFR BLD AUTO: 37.2 % (ref 36–46)
HGB BLD-MCNC: 11.2 G/DL (ref 12–16)
MCH RBC QN AUTO: 24.6 PG (ref 26–34)
MCHC RBC AUTO-ENTMCNC: 30.1 G/DL (ref 32–36)
MCV RBC AUTO: 82 FL (ref 80–100)
NRBC BLD-RTO: 0 /100 WBCS (ref 0–0)
PLATELET # BLD AUTO: 432 X10*3/UL (ref 150–450)
RBC # BLD AUTO: 4.56 X10*6/UL (ref 4–5.2)
TSH SERPL DL<=0.05 MIU/L-ACNC: 0.55 MIU/L (ref 0.27–4.2)
WBC # BLD AUTO: 13 X10*3/UL (ref 4.4–11.3)

## 2024-04-17 PROCEDURE — 2500000004 HC RX 250 GENERAL PHARMACY W/ HCPCS (ALT 636 FOR OP/ED): Performed by: INTERNAL MEDICINE

## 2024-04-17 PROCEDURE — 93017 CV STRESS TEST TRACING ONLY: CPT

## 2024-04-17 PROCEDURE — 3430000001 HC RX 343 DIAGNOSTIC RADIOPHARMACEUTICALS: Performed by: INTERNAL MEDICINE

## 2024-04-17 PROCEDURE — 85027 COMPLETE CBC AUTOMATED: CPT

## 2024-04-17 PROCEDURE — 78452 HT MUSCLE IMAGE SPECT MULT: CPT | Performed by: RADIOLOGY

## 2024-04-17 PROCEDURE — 36415 COLL VENOUS BLD VENIPUNCTURE: CPT

## 2024-04-17 PROCEDURE — A9502 TC99M TETROFOSMIN: HCPCS | Performed by: INTERNAL MEDICINE

## 2024-04-17 PROCEDURE — 78452 HT MUSCLE IMAGE SPECT MULT: CPT

## 2024-04-17 PROCEDURE — 84443 ASSAY THYROID STIM HORMONE: CPT

## 2024-04-17 PROCEDURE — 83036 HEMOGLOBIN GLYCOSYLATED A1C: CPT

## 2024-04-17 RX ORDER — REGADENOSON 0.08 MG/ML
0.4 INJECTION, SOLUTION INTRAVENOUS
Status: COMPLETED | OUTPATIENT
Start: 2024-04-17 | End: 2024-04-17

## 2024-04-17 RX ADMIN — REGADENOSON 0.4 MG: 0.08 INJECTION, SOLUTION INTRAVENOUS at 10:04

## 2024-04-17 RX ADMIN — TETROFOSMIN 33.8 MILLICURIE: 0.23 INJECTION, POWDER, LYOPHILIZED, FOR SOLUTION INTRAVENOUS at 10:04

## 2024-04-17 RX ADMIN — TETROFOSMIN 10.9 MILLICURIE: 0.23 INJECTION, POWDER, LYOPHILIZED, FOR SOLUTION INTRAVENOUS at 08:50

## 2024-04-25 ENCOUNTER — TELEPHONE (OUTPATIENT)
Dept: CARDIOLOGY | Facility: CLINIC | Age: 74
End: 2024-04-25
Payer: MEDICARE

## 2024-04-25 NOTE — TELEPHONE ENCOUNTER
----- Message from Soledad Blank MD sent at 4/24/2024  4:24 PM EDT -----  Tell patient that her stress test was overall okay.  Let me see her as scheduled  ----- Message -----  From: Vitaliy, Radiology Results In  Sent: 4/17/2024   3:45 PM EDT  To: Soledad Blank MD

## 2024-04-30 ENCOUNTER — OFFICE VISIT (OUTPATIENT)
Dept: CARDIOLOGY | Facility: CLINIC | Age: 74
End: 2024-04-30
Payer: MEDICARE

## 2024-04-30 VITALS
TEMPERATURE: 98.6 F | HEIGHT: 63 IN | OXYGEN SATURATION: 98 % | BODY MASS INDEX: 26.58 KG/M2 | WEIGHT: 150 LBS | SYSTOLIC BLOOD PRESSURE: 125 MMHG | DIASTOLIC BLOOD PRESSURE: 60 MMHG | HEART RATE: 67 BPM | RESPIRATION RATE: 18 BRPM

## 2024-04-30 DIAGNOSIS — Z95.5 PRESENCE OF STENT IN CORONARY ARTERY: ICD-10-CM

## 2024-04-30 DIAGNOSIS — R00.2 PALPITATIONS: ICD-10-CM

## 2024-04-30 DIAGNOSIS — R07.9 CHEST PAIN IN ADULT: ICD-10-CM

## 2024-04-30 DIAGNOSIS — E78.1 PURE HYPERGLYCERIDEMIA: ICD-10-CM

## 2024-04-30 DIAGNOSIS — I73.9 PAD (PERIPHERAL ARTERY DISEASE) (CMS-HCC): ICD-10-CM

## 2024-04-30 DIAGNOSIS — Z95.9 S/P ARTERIAL STENT: Primary | ICD-10-CM

## 2024-04-30 DIAGNOSIS — E78.00 PURE HYPERCHOLESTEROLEMIA: ICD-10-CM

## 2024-04-30 PROCEDURE — 99214 OFFICE O/P EST MOD 30 MIN: CPT | Performed by: INTERNAL MEDICINE

## 2024-04-30 PROCEDURE — 3044F HG A1C LEVEL LT 7.0%: CPT | Performed by: INTERNAL MEDICINE

## 2024-04-30 PROCEDURE — 1036F TOBACCO NON-USER: CPT | Performed by: INTERNAL MEDICINE

## 2024-04-30 PROCEDURE — 1126F AMNT PAIN NOTED NONE PRSNT: CPT | Performed by: INTERNAL MEDICINE

## 2024-04-30 PROCEDURE — 3074F SYST BP LT 130 MM HG: CPT | Performed by: INTERNAL MEDICINE

## 2024-04-30 PROCEDURE — 1160F RVW MEDS BY RX/DR IN RCRD: CPT | Performed by: INTERNAL MEDICINE

## 2024-04-30 PROCEDURE — 3078F DIAST BP <80 MM HG: CPT | Performed by: INTERNAL MEDICINE

## 2024-04-30 PROCEDURE — 1159F MED LIST DOCD IN RCRD: CPT | Performed by: INTERNAL MEDICINE

## 2024-04-30 ASSESSMENT — LIFESTYLE VARIABLES
HAVE YOU OR SOMEONE ELSE BEEN INJURED AS A RESULT OF YOUR DRINKING: NO
HOW OFTEN DO YOU HAVE SIX OR MORE DRINKS ON ONE OCCASION: NEVER
SKIP TO QUESTIONS 9-10: 1
HOW MANY STANDARD DRINKS CONTAINING ALCOHOL DO YOU HAVE ON A TYPICAL DAY: PATIENT DOES NOT DRINK
HOW MANY STANDARD DRINKS CONTAINING ALCOHOL DO YOU HAVE ON A TYPICAL DAY: PATIENT DOES NOT DRINK
AUDIT-C TOTAL SCORE: 0
HAS A RELATIVE, FRIEND, DOCTOR, OR ANOTHER HEALTH PROFESSIONAL EXPRESSED CONCERN ABOUT YOUR DRINKING OR SUGGESTED YOU CUT DOWN: NO
HOW OFTEN DO YOU HAVE A DRINK CONTAINING ALCOHOL: NEVER
HOW OFTEN DO YOU HAVE A DRINK CONTAINING ALCOHOL: NEVER

## 2024-04-30 ASSESSMENT — PATIENT HEALTH QUESTIONNAIRE - PHQ9
SUM OF ALL RESPONSES TO PHQ9 QUESTIONS 1 AND 2: 0
2. FEELING DOWN, DEPRESSED OR HOPELESS: NOT AT ALL
1. LITTLE INTEREST OR PLEASURE IN DOING THINGS: NOT AT ALL

## 2024-04-30 ASSESSMENT — ENCOUNTER SYMPTOMS
LOSS OF SENSATION IN FEET: 0
OCCASIONAL FEELINGS OF UNSTEADINESS: 1
DEPRESSION: 0

## 2024-04-30 ASSESSMENT — PAIN SCALES - GENERAL: PAINLEVEL: 0-NO PAIN

## 2024-04-30 NOTE — PROGRESS NOTES
History of present illness:  73 year old female patient here today following up on hx of CAD status post PCI to RAMUS and bifurcation, LCX complicated by dissection of one branch, small NSTEMI, she had failed  of RCA at . (CCS Class II and NYHA Class II-III). MCT monitor was overall normal with no major arrhythmias. Cardiac catheterization on 12/22/2020 status post PCI to LCX with WENDY. 2D Echo on 01/11/2021 showed mild MR with EF of 50-55%. Patient was hospitalized in December 2022 with shortness of breath secondary chronic obstructive pulmonary disease exacerbation. Was found to have emphysema with moderate obstructive lung disease. Her workup from cardiac standpoint was stable with normal troponin. 2D echo showed ejection fraction of 50% with mild aortic stenosis in December 2022.  Stress test on April 17, 2023 showed no ischemia normal ejection fraction possible mild apical scar.  Patient is feeling overall good.  Nuys any chest pain shortness of breath palpitations dizziness or lightheadedness.    Past Medical History:   Diagnosis Date    Carotid stenosis 08/14/2018    Formatting of this note might be different from the original. Per [facility] office visit 8-13-18    Coronary artery disease involving native coronary artery of native heart without angina pectoris 10/11/2018    Diabetes mellitus type 2, controlled, without complications (Multi) 08/28/2023    DVT (deep venous thrombosis) (Multi) 08/23/2019    Formatting of this note might be different from the original. H/o DVT 2008 per [facility] office visit 8-19-19    Essential hypertension 08/28/2023    Hyperlipidemia 08/28/2023    Other forms of angina pectoris (CMS-HCC) 04/15/2020    Formatting of this note might be different from the original. Per Dr. Blank office visit 4-6-20    PAD (peripheral artery disease) (CMS-HCC) 01/15/2018    Formatting of this note might be different from the original. Per [facility] office visit 8-19-19    Palpitations 08/20/2020     Formatting of this note might be different from the original. Per Dr. Blank office visit 8-12-20    Personal history of other diseases of the circulatory system 03/11/2019    History of peripheral arterial disease    Personal history of other endocrine, nutritional and metabolic disease 03/11/2019    History of diabetes mellitus    Personal history of transient ischemic attack (TIA), and cerebral infarction without residual deficits 02/19/2019    History of cerebrovascular accident    Presence of stent in coronary artery 08/28/2023    Pure hypercholesterolemia 08/23/2019    Formatting of this note might be different from the original. Per [facility] office visit 8-19-19    Pure hyperglyceridemia 04/01/2019    SOB (shortness of breath) 01/08/2018    Formatting of this note might be different from the original. Will schedule Lexiscan Myoview to evaluate CAD and angina symptoms. (patient unable to perform exercise myoview due to SOB) per Dr. Blank office visit 12/19/17    Stage 1 mild COPD by GOLD classification (Multi) 08/23/2019    Formatting of this note might be different from the original. Per [facility] office visit 8-19-19       Past Surgical History:   Procedure Laterality Date    BI STEREOTACTIC GUIDED BREAST LEFT LOCALIZATION AND BIOPSY Left 12/16/2020    BI STEREOTACTIC GUIDED BREAST LOCALIZATION AND BIOPSY LEFT [facility]    BI US GUIDED BREAST LOCALIZATION AND BIOPSY LEFT Left 11/17/2016    BI US GUIDED BREAST LOCALIZATION AND BIOPSY LEFT [facility]    BREAST BIOPSY Left     OTHER SURGICAL HISTORY  02/19/2019    Carpal tunnel surgery    OTHER SURGICAL HISTORY  02/19/2019    Back surgery    OTHER SURGICAL HISTORY  02/19/2019    [place] filter placement    OTHER SURGICAL HISTORY  02/19/2019    Elbow surgery    OTHER SURGICAL HISTORY  02/19/2019    Internal carotid thromboendarterectomy    OTHER SURGICAL HISTORY  02/19/2019    Arterial stent placement       Allergies   Allergen Reactions     Metoprolol Shortness of breath and Unknown    Other Other    Albumin Products Unknown    Haemophilus Influenzae Type B Unknown    Hydrochlorothiazide Unknown    Latex Unknown and Hives    Nicotine Unknown and Hives     Nicotine patches    Pollen Extracts Other     Congestion, itchy eyes, runny nose    Adhesive Tape-Silicones Hives    Flu Vaccine 2012-13(3 Yr+)(Pf) Other    Valsartan Other        reports that she quit smoking about 16 years ago. Her smoking use included cigarettes. She has never been exposed to tobacco smoke. She has never used smokeless tobacco. She reports that she does not drink alcohol and does not use drugs.    Family History   Problem Relation Name Age of Onset    Emphysema Mother      Heart disease Mother      Stroke Mother      Asthma Mother      Brain cancer Father      Liver cancer Father      Skin cancer Father      Lung cancer Father      Cancer Father      Hypertension Brother      Diabetes Brother      Watts's esophagus Brother      No Known Problems Brother      Heart disease Maternal Grandmother      Cancer Maternal Grandfather         Patient's Medications   New Prescriptions    No medications on file   Previous Medications    ACETAMINOPHEN (TYLENOL) 500 MG CAPSULE    1 capsule as needed Orally every 8 hours    ALBUTEROL (PROVENTIL HFA) 90 MCG/ACTUATION INHALER    2 puffs Inhaler every 4 hours    ASPIRIN 81 MG EC TABLET    1 tablet Orally Once a day    ATENOLOL (TENORMIN) 25 MG TABLET    TAKE 1 TABLET DAILY.    ATORVASTATIN (LIPITOR) 80 MG TABLET    1 tablet Orally Once a day for 30 day(s)    BUDESONIDE-GLYCOPYR-FORMOTEROL (BREZTRI AEROSPHERE) 160-9-4.8 MCG/ACTUATION HFA AEROSOL INHALER    Inhale 2 puffs twice a day.    CALCIUM CARBONATE-SIMETHICONE (MAALOX ADVANCED) 1,000-60 MG TABLET,CHEWABLE    Chew 1 tablet every 6 hours if needed.    CALCIUM CARBONATE-VITAMIN D3 (CALTRATE WITH VITAMIN D3) 600 MG-20 MCG (800 UNIT) TABLET    Take 1 tablet daily    CALCIUM CARBONATE/VITAMIN D2  (CALCIUM CARBONATE-VITAMIN D PO)    Calcium Carbonate-Vitamin D    CARVEDILOL (COREG) 3.125 MG TABLET    1 tablet with food Orally Twice a day    CETIRIZINE HCL (ZYRTEC ORAL)    Zyrtec    CLOPIDOGREL (PLAVIX) 75 MG TABLET    1 tablet Orally Once a day    COENZYME Q-10 10 MG CAPSULE    TAKE 1 CAPSULE 2-3 TIMES DAILY AS DIRECTED.    DILTIAZEM CD (CARTIA XT) 240 MG 24 HR CAPSULE    1 capsule Orally Once a day for 90    EMPAGLIFLOZIN (JARDIANCE) 10 MG    1 tablet Orally Once a day for 30 day(s)    FEXOFENADINE HCL (ALLEGRA ORAL)    TAKE 1 CAPSULE TWICE DAILY AS NEEDED.    FLUTICASONE FUROATE-VILANTEROL (BREO ELLIPTA) 200-25 MCG/DOSE INHALER    Inhalation Once a day    FLUTICASONE PROPIONATE (FLONASE NASL)    Flonase    FUROSEMIDE (LASIX) 20 MG TABLET    TAKE 1 TABLET DAILY.    GABAPENTIN (NEURONTIN) 300 MG CAPSULE    Take 1 capsule by mouth three times daily for 90 days.    GLIMEPIRIDE (AMARYL) 1 MG TABLET    TAKE 1 TABLET BY MOUTH EVERY DAY WITH BREAKFAST    HYDROCHLOROTHIAZIDE (HYDRODIURIL) 25 MG TABLET    1 tablet in the morning Orally Once a day for 90    ISOSORBIDE MONONITRATE ER (IMDUR) 120 MG 24 HR TABLET    1 tablet in the morning Orally Once a day for 90    METFORMIN (GLUCOPHAGE) 500 MG TABLET    1 tablet with meals Orally Twice a day    METHIMAZOLE (TAPAZOLE) 5 MG TABLET    TAKE 1 TABLET BY MOUTH EVERY DAY    NITROFURANTOIN, MACROCRYSTAL-MONOHYDRATE, (MACROBID) 100 MG CAPSULE    1 capsule with food Orally every 12 hrs    NITROGLYCERIN (NITROSTAT) 0.4 MG SL TABLET    1 tablet Sublingual prn for chest pain for 30 days    NUTRITIONAL SUPPLEMENTS ORAL    Azo- taking twice a day    OMEGA-3 FATTY ACIDS-FISH OIL (FISH OIL) 300-500 MG CAPSULE    1 capsule Orally daily    PANTOPRAZOLE (PROTONIX) 40 MG EC TABLET    1 tablet Orally Once a day for 30 day(s)    POTASSIUM CHLORIDE CR (KLOR-CON M20) 20 MEQ ER TABLET    TAKE 1 TABLET BY MOUTH TWICE A DAY    RED YEAST RICE 600 MG TABLET    TAKE TABLET    ROSUVASTATIN (CRESTOR)  40 MG TABLET    TAKE 1 TABLET BY MOUTH EVERY DAY FOR 90 DAYS for 90    TAMSULOSIN (FLOMAX) 0.4 MG 24 HR CAPSULE    Take 1 capsule (0.4 mg) by mouth once daily.    TRIAMCINOLONE (KENALOG) 0.1 % CREAM    Kenalog Cream 0.1%   Modified Medications    No medications on file   Discontinued Medications    ATORVASTATIN (LIPITOR) 40 MG TABLET    TAKE 1 TABLET AT BEDTIME.       Objective   Physical Exam  General: Patient in no acute distress   HEENT: Atraumatic normocephalic.  Neck: Supple, jugular venous pressure within normal limit.  No bruits  Lungs: Clear to auscultation bilaterally  Cardiovascular: Regular rate and rhythm, normal heart sounds, no murmurs rubs or gallops  Abdomen: Soft nontender nondistended.  Normal bowel sounds.  Extremities: Warm to touch, no edema.        Lab Review   Lab on 04/17/2024   Component Date Value    Thyroid Stimulating Horm* 04/17/2024 0.55     WBC 04/17/2024 13.0 (H)     nRBC 04/17/2024 0.0     RBC 04/17/2024 4.56     Hemoglobin 04/17/2024 11.2 (L)     Hematocrit 04/17/2024 37.2     MCV 04/17/2024 82     MCH 04/17/2024 24.6 (L)     MCHC 04/17/2024 30.1 (L)     RDW 04/17/2024 18.0 (H)     Platelets 04/17/2024 432     Hemoglobin A1C 04/17/2024 6.7 (H)     Estimated Average Glucose 04/17/2024 146         Assessment/Plan   Patient Active Problem List   Diagnosis    Arthritis    Coronary artery disease involving native coronary artery of native heart without angina pectoris    Other forms of angina pectoris (CMS-[facility])    Stage 1 mild COPD by GOLD classification (Multi)    Diabetes (Multi)    Diabetes mellitus type 2, controlled, without complications (Multi)    Essential hypertension    GERD (gastroesophageal reflux disease)    Hyperlipidemia    Nodular thyroid disease    Presence of stent in coronary artery    Subclinical hyperthyroidism    Abnormal TSH    Watts's esophagus without dysplasia    DM type 2, controlled, with complication (Multi)    DM type 2, controlled, with complication  (Multi)    Carotid stenosis    DVT (deep venous thrombosis) (Multi)    Eczema    S/P arterial stent    Incomplete emptying of bladder    Lichen planus    PAD (peripheral artery disease) (CMS-HCC)    Palpitations    Primary osteoarthritis of both hips    Pure hypercholesterolemia    Pure hyperglyceridemia    SOB (shortness of breath)    Back problem      73 year old female patient here today following up on hx of CAD status post PCI to RAMUS and bifurcation, LCX complicated by dissection of one branch, small NSTEMI, she had failed  of RCA at . (CCS Class II and NYHA Class II-III). MCT monitor was overall normal with no major arrhythmias. Cardiac catheterization on 12/22/2020 status post PCI to LCX with WENDY. 2D Echo on 01/11/2021 showed mild MR with EF of 50-55%. Patient was hospitalized in December 2022 with shortness of breath secondary chronic obstructive pulmonary disease exacerbation. Was found to have emphysema with moderate obstructive lung disease. Her workup from cardiac standpoint was stable with normal troponin. 2D echo showed ejection fraction of 50% with mild aortic stenosis in December 2022.  Stress test on April 17, 2023 showed no ischemia normal ejection fraction possible mild apical scar.  Patient is feeling overall good.  Nuys any chest pain shortness of breath palpitations dizziness or lightheadedness.  Blood pressure and heart rate well-controlled.  At this point my recommendation is continue current medications.  Reviewed patient labs she needs repeated lipid panel this year.  Otherwise her kidney function stable on optimal medical therapy.  Discussed with her lifestyle modification.  Has been losing weight.  Will follow-up in 6 months.    Soledad Blank MD

## 2024-05-01 NOTE — PROGRESS NOTES
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          Thyroid ultrasound:   April 2022 thyroid u/s: R>L         -R 2.7cm complex nodule         -R 1.2cm complex nodule         - R 1.8cm comples nodule         -L 0.7cm solid nodule         -L 1.4cm solid nodule    Thyroid ultrasound oct 2023:  -R 2.2, R 1.3 (similar), L sub cm nodules, isthmus 1.1cm  -nodules graded TR2    Since last visit:  -on 5mg methimazole every day  -euthyroid, fullness/phlegm with swallowing                 Since last visit:         -taking methimazole 5mg qd, thyroid labs tsh-0.57         -pt euthyroid         -no obstructive goiter sx               Current Outpatient Medications:     acetaminophen (Tylenol) 500 mg capsule, 1 capsule as needed Orally every 8 hours, Disp: , Rfl:     albuterol (Proventil HFA) 90 mcg/actuation inhaler, 2 puffs Inhaler every 4 hours, Disp: , Rfl:     aspirin 81 mg EC tablet, 1 tablet Orally Once a day, Disp: , Rfl:     atenolol (Tenormin) 25 mg tablet, TAKE 1 TABLET DAILY., Disp: , Rfl:     atorvastatin (Lipitor) 80 mg tablet, 1 tablet Orally Once a day for 30 day(s), Disp: , Rfl:     budesonide-glycopyr-formoterol (Breztri Aerosphere) 160-9-4.8 mcg/actuation HFA aerosol inhaler, Inhale 2 puffs twice a day., Disp: , Rfl:     calcium carbonate-simethicone (Maalox Advanced) 1,000-60 mg tablet,chewable, Chew 1 tablet every 6 hours if needed., Disp: , Rfl:     calcium carbonate-vitamin D3 (Caltrate with Vitamin D3) 600 mg-20 mcg (800 unit) tablet, Take 1 tablet daily, Disp: , Rfl:     calcium carbonate/vitamin D2 (CALCIUM CARBONATE-VITAMIN D PO), Calcium Carbonate-Vitamin D, Disp: , Rfl:     carvedilol (Coreg) 3.125 mg tablet, 1 tablet with food Orally Twice a day, Disp: , Rfl:     cetirizine HCl (ZYRTEC ORAL), Zyrtec, Disp: , Rfl:     clopidogrel (Plavix) 75 mg tablet, 1 tablet Orally Once a day, Disp: ,  Rfl:     coenzyme Q-10 10 mg capsule, TAKE 1 CAPSULE 2-3 TIMES DAILY AS DIRECTED., Disp: , Rfl:     dilTIAZem CD (Cartia XT) 240 mg 24 hr capsule, 1 capsule Orally Once a day for 90, Disp: , Rfl:     empagliflozin (Jardiance) 10 mg, 1 tablet Orally Once a day for 30 day(s), Disp: , Rfl:     fexofenadine HCl (ALLEGRA ORAL), TAKE 1 CAPSULE TWICE DAILY AS NEEDED., Disp: , Rfl:     fluticasone furoate-vilanteroL (Breo Ellipta) 200-25 mcg/dose inhaler, Inhalation Once a day, Disp: , Rfl:     fluticasone propionate (FLONASE NASL), Flonase, Disp: , Rfl:     furosemide (Lasix) 20 mg tablet, TAKE 1 TABLET DAILY., Disp: , Rfl:     gabapentin (Neurontin) 300 mg capsule, Take 1 capsule by mouth three times daily for 90 days., Disp: , Rfl:     glimepiride (Amaryl) 1 mg tablet, TAKE 1 TABLET BY MOUTH EVERY DAY WITH BREAKFAST, Disp: , Rfl:     hydroCHLOROthiazide (HYDRODiuril) 25 mg tablet, 1 tablet in the morning Orally Once a day for 90, Disp: , Rfl:     isosorbide mononitrate ER (Imdur) 120 mg 24 hr tablet, 1 tablet in the morning Orally Once a day for 90, Disp: , Rfl:     metFORMIN (Glucophage) 500 mg tablet, 1 tablet with meals Orally Twice a day, Disp: , Rfl:     methIMAzole (Tapazole) 5 mg tablet, TAKE 1 TABLET BY MOUTH EVERY DAY (Patient not taking: Reported on 12/13/2023), Disp: 90 tablet, Rfl: 1    nitrofurantoin, macrocrystal-monohydrate, (Macrobid) 100 mg capsule, 1 capsule with food Orally every 12 hrs, Disp: , Rfl:     nitroglycerin (Nitrostat) 0.4 mg SL tablet, 1 tablet Sublingual prn for chest pain for 30 days, Disp: , Rfl:     NUTRITIONAL SUPPLEMENTS ORAL, Azo- taking twice a day, Disp: , Rfl:     omega-3 fatty acids-fish oil (Fish OiL) 300-500 mg capsule, 1 capsule Orally daily, Disp: , Rfl:     pantoprazole (ProtoNix) 40 mg EC tablet, 1 tablet Orally Once a day for 30 day(s), Disp: , Rfl:     potassium chloride CR (Klor-Con M20) 20 mEq ER tablet, TAKE 1 TABLET BY MOUTH TWICE A DAY, Disp: , Rfl:     red yeast  rice 600 mg tablet, TAKE TABLET, Disp: , Rfl:     rosuvastatin (Crestor) 40 mg tablet, TAKE 1 TABLET BY MOUTH EVERY DAY FOR 90 DAYS for 90, Disp: , Rfl:     tamsulosin (Flomax) 0.4 mg 24 hr capsule, Take 1 capsule (0.4 mg) by mouth once daily., Disp: , Rfl:     triamcinolone (Kenalog) 0.1 % cream, Kenalog Cream 0.1%, Disp: , Rfl:       Allergies as of 05/02/2024 - Reviewed 05/02/2024   Allergen Reaction Noted    Metoprolol Shortness of breath and Unknown 01/10/2023    Other Other 08/28/2023    Albumin products Unknown 08/28/2023    Haemophilus influenzae type b Unknown 04/30/2024    Hydrochlorothiazide Unknown 08/28/2023    Latex Unknown and Hives 05/11/2017    Nicotine Unknown and Hives 01/11/2021    Pollen extracts Other 05/23/2016    Adhesive tape-silicones Hives 08/28/2023    Flu vaccine 2012-13(3 yr+)(pf) Other 08/28/2023    Valsartan Other 01/10/2023         Review of Systems   Cardiology: Lightheadedness-denies.  Chest pain-denies.  Leg edema-denies.  Palpitations-denies.  Respiratory: Cough-denies. Shortness of breath +.  Wheezing-denies.  Gastroenterology: Constipation-denies.  Diarrhea-denies.  Heartburn-denies.  Endocrinology: Cold intolerance-denies.  Heat intolerance-denies.  Sweats-denies.  Neurology: Headache-denies.  Tremor-denies.  Neuropathy in extremities-denies.  Psychology: Low energy +  Irritability-denies.  Sleep disturbances-denies.      /59 (BP Location: Right arm, Patient Position: Sitting)   Pulse 68   Wt 68.6 kg (151 lb 3.2 oz)   BMI 26.78 kg/m²       Labs:  Lab Results   Component Value Date    WBC 13.0 (H) 04/17/2024    NRBC 0.0 04/17/2024    RBC 4.56 04/17/2024    HGB 11.2 (L) 04/17/2024    HCT 37.2 04/17/2024     04/17/2024     Lab Results   Component Value Date    CALCIUM 9.4 11/16/2023    AST 10 07/17/2023    ALKPHOS 61 07/17/2023    BILITOT 0.3 07/17/2023    PROT 6.6 07/17/2023    ALBUMIN 4.3 07/17/2023    GLOB 2.3 07/17/2023    AGR 1.9 07/17/2023      "11/16/2023    K 3.5 11/16/2023     11/16/2023    CO2 26 11/16/2023    ANIONGAP 17 11/16/2023    BUN 14 11/16/2023    CREATININE 0.70 11/16/2023    UREACREAUR 18.8 07/17/2023    GLUCOSE 115 (H) 11/16/2023    ALT 10 07/17/2023    EGFR >90 11/16/2023     Lab Results   Component Value Date    CHOL 155 04/06/2021    TRIG 332 (H) 04/06/2021    HDL 33 (L) 04/06/2021    LDLCALC 56 (L) 04/06/2021     Lab Results   Component Value Date    MICROALBCREA 25.9 07/17/2023     Lab Results   Component Value Date    TSH 0.55 04/17/2024     No results found for: \"[id number]\"  Lab Results   Component Value Date    HGBA1C 6.7 (H) 04/17/2024         Physical Exam   General Appearance: pleasant, cooperative, no acute distress  HEENT: no chemosis, no proptosis, no lid lag, no lid retraction  Neck: no lymphadenopathy, no thyromegaly, no dominant thyroid nodules  Heart: no murmurs, regular rate and rhythm, S1 and S2  Lungs: no wheezes, no rhonci, no rales  Extremities: no lower extremity swelling      Assessment/Plan   1. Subclinical hyperthyroidism  -continue methimazole long term  -repeat labs in 6 months (orders entered)  -can follow in 1 year (orders for labs entered)         Follow Up:  Jonna 1 year    -labs/tests/notes reviewed  -reviewed and counseled patient on medication monitoring and side effects          "

## 2024-05-02 ENCOUNTER — OFFICE VISIT (OUTPATIENT)
Dept: ENDOCRINOLOGY | Facility: CLINIC | Age: 74
End: 2024-05-02
Payer: MEDICARE

## 2024-05-02 VITALS
HEART RATE: 68 BPM | WEIGHT: 151.2 LBS | SYSTOLIC BLOOD PRESSURE: 112 MMHG | BODY MASS INDEX: 26.78 KG/M2 | DIASTOLIC BLOOD PRESSURE: 59 MMHG

## 2024-05-02 DIAGNOSIS — E05.90 SUBCLINICAL HYPERTHYROIDISM: Primary | ICD-10-CM

## 2024-05-02 PROCEDURE — 1159F MED LIST DOCD IN RCRD: CPT | Performed by: INTERNAL MEDICINE

## 2024-05-02 PROCEDURE — 3078F DIAST BP <80 MM HG: CPT | Performed by: INTERNAL MEDICINE

## 2024-05-02 PROCEDURE — 1126F AMNT PAIN NOTED NONE PRSNT: CPT | Performed by: INTERNAL MEDICINE

## 2024-05-02 PROCEDURE — 3044F HG A1C LEVEL LT 7.0%: CPT | Performed by: INTERNAL MEDICINE

## 2024-05-02 PROCEDURE — 1160F RVW MEDS BY RX/DR IN RCRD: CPT | Performed by: INTERNAL MEDICINE

## 2024-05-02 PROCEDURE — 1036F TOBACCO NON-USER: CPT | Performed by: INTERNAL MEDICINE

## 2024-05-02 PROCEDURE — 3074F SYST BP LT 130 MM HG: CPT | Performed by: INTERNAL MEDICINE

## 2024-05-02 PROCEDURE — 99213 OFFICE O/P EST LOW 20 MIN: CPT | Performed by: INTERNAL MEDICINE

## 2024-05-02 ASSESSMENT — PAIN SCALES - GENERAL: PAINLEVEL: 0-NO PAIN

## 2024-05-02 ASSESSMENT — ENCOUNTER SYMPTOMS: DEPRESSION: 0

## 2024-05-16 DIAGNOSIS — E78.2 MIXED HYPERLIPIDEMIA: ICD-10-CM

## 2024-05-16 RX ORDER — ROSUVASTATIN CALCIUM 40 MG/1
40 TABLET, COATED ORAL DAILY
Qty: 90 TABLET | Refills: 3 | Status: SHIPPED | OUTPATIENT
Start: 2024-05-16 | End: 2025-05-11

## 2024-05-16 NOTE — TELEPHONE ENCOUNTER
Pharmacy is requesting a refill on behalf of the pt       Requested Prescriptions     Pending Prescriptions Disp Refills    rosuvastatin (Crestor) 40 mg tablet [Pharmacy Med Name: ROSUVASTATIN CALCIUM 40 MG TAB] 90 tablet 3     Sig: Take 1 tablet (40 mg) by mouth once daily.

## 2024-05-31 DIAGNOSIS — E04.1 NONTOXIC SINGLE THYROID NODULE: ICD-10-CM

## 2024-05-31 RX ORDER — METHIMAZOLE 5 MG/1
5 TABLET ORAL DAILY
Qty: 90 TABLET | Refills: 1 | Status: SHIPPED | OUTPATIENT
Start: 2024-05-31

## 2024-07-26 ENCOUNTER — LAB (OUTPATIENT)
Dept: LAB | Facility: LAB | Age: 74
End: 2024-07-26
Payer: MEDICARE

## 2024-07-26 DIAGNOSIS — D64.9 ANEMIA, UNSPECIFIED: Primary | ICD-10-CM

## 2024-07-26 DIAGNOSIS — E11.9 TYPE 2 DIABETES MELLITUS WITHOUT COMPLICATIONS (MULTI): ICD-10-CM

## 2024-07-26 LAB
ALBUMIN SERPL-MCNC: 4.8 G/DL (ref 3.5–5)
ALP BLD-CCNC: 69 U/L (ref 35–125)
ALT SERPL-CCNC: 10 U/L (ref 5–40)
ANION GAP SERPL CALC-SCNC: 18 MMOL/L
AST SERPL-CCNC: 12 U/L (ref 5–40)
BILIRUB DIRECT SERPL-MCNC: <0.2 MG/DL (ref 0–0.2)
BILIRUB SERPL-MCNC: 0.3 MG/DL (ref 0.1–1.2)
BUN SERPL-MCNC: 15 MG/DL (ref 8–25)
CALCIUM SERPL-MCNC: 10.1 MG/DL (ref 8.5–10.4)
CHLORIDE SERPL-SCNC: 99 MMOL/L (ref 97–107)
CO2 SERPL-SCNC: 27 MMOL/L (ref 24–31)
CREAT SERPL-MCNC: 1 MG/DL (ref 0.4–1.6)
EGFRCR SERPLBLD CKD-EPI 2021: 59 ML/MIN/1.73M*2
ERYTHROCYTE [DISTWIDTH] IN BLOOD BY AUTOMATED COUNT: 18.8 % (ref 11.5–14.5)
GLUCOSE SERPL-MCNC: 165 MG/DL (ref 65–99)
HCT VFR BLD AUTO: 40.4 % (ref 36–46)
HGB BLD-MCNC: 12 G/DL (ref 12–16)
MCH RBC QN AUTO: 24 PG (ref 26–34)
MCHC RBC AUTO-ENTMCNC: 29.7 G/DL (ref 32–36)
MCV RBC AUTO: 81 FL (ref 80–100)
NRBC BLD-RTO: 0 /100 WBCS (ref 0–0)
PLATELET # BLD AUTO: 406 X10*3/UL (ref 150–450)
POTASSIUM SERPL-SCNC: 3.4 MMOL/L (ref 3.4–5.1)
PROT SERPL-MCNC: 7.3 G/DL (ref 5.9–7.9)
RBC # BLD AUTO: 5.01 X10*6/UL (ref 4–5.2)
SODIUM SERPL-SCNC: 144 MMOL/L (ref 133–145)
WBC # BLD AUTO: 13.2 X10*3/UL (ref 4.4–11.3)

## 2024-07-26 PROCEDURE — 80053 COMPREHEN METABOLIC PANEL: CPT

## 2024-07-26 PROCEDURE — 85027 COMPLETE CBC AUTOMATED: CPT

## 2024-07-26 PROCEDURE — 82248 BILIRUBIN DIRECT: CPT

## 2024-08-19 ENCOUNTER — TELEPHONE (OUTPATIENT)
Dept: CARDIOLOGY | Facility: CLINIC | Age: 74
End: 2024-08-19
Payer: MEDICARE

## 2024-08-19 NOTE — TELEPHONE ENCOUNTER
Patient needs to know if she should also stop her aspirin for her upcoming cataract and EGD procedures.

## 2024-10-03 ENCOUNTER — EVALUATION (OUTPATIENT)
Dept: PHYSICAL THERAPY | Facility: CLINIC | Age: 74
End: 2024-10-03
Payer: MEDICARE

## 2024-10-03 DIAGNOSIS — R26.89 OTHER ABNORMALITIES OF GAIT AND MOBILITY: Primary | ICD-10-CM

## 2024-10-03 DIAGNOSIS — R29.6 REPEATED FALLS: ICD-10-CM

## 2024-10-03 PROCEDURE — 97161 PT EVAL LOW COMPLEX 20 MIN: CPT | Mod: GP

## 2024-10-03 ASSESSMENT — PAIN SCALES - GENERAL: PAINLEVEL_OUTOF10: 5 - MODERATE PAIN

## 2024-10-03 ASSESSMENT — PAIN - FUNCTIONAL ASSESSMENT: PAIN_FUNCTIONAL_ASSESSMENT: 0-10

## 2024-10-03 NOTE — PROGRESS NOTES
Physical Therapy Evaluation     Patient Name: Maia Gunderson  MRN: [id number]  Today's Date: 10/3/2024  Time Calculation  Start Time: 0935  Stop Time: 1005  Time Calculation (min): 30 min  PT Evaluation Time Entry  PT Evaluation (Low) Time Entry: 30          Insurance Considerations: Payor: MEDICARE / Plan: MEDICARE PART A AND B / Product Type: *No Product type* /   MEDIC[place] A/[place], [place], NO AUTH ( $0 USED PT/ST)     Problem List Items Addressed This Visit    None  Visit Diagnoses         Codes    Other abnormalities of gait and mobility    -  Primary R26.89    Relevant Orders    Follow Up In Physical Therapy    Repeated falls     R29.6    Relevant Orders    Follow Up In Physical Therapy          Subjective  /General:  General  Reason for Referral: Repeated falls  Referred By: Jersey Bowers  Patient reported hx of current condition: The pt presents to the clinic stating that over the past 6 months she has been falling anytime that she gets up to go to the bathroom. She notes that it was worse when she was taking a medication, but it continues to happen and she feels very unsteady and wobbling. The pt denies pain. The pt does not utilize any assistive devices, but she does have a standard walker at home.    The pt states that she also has chronic low back pain. She notes that she had a fusion in 2008. Limits her ability to walk long distances.     Aggravating factors: Falls are happening most at night     Precautions/ Red Flags:  Precautions  Medical Precautions: Fall precautions    UQ Red flags   Hx of CA No   Pacemaker/ Electronic Implant No   Sudden Weakness No   Recent Falls (within last 6mo) Yes - 4 - happening at night when she gets up to go to the bathroom       Relevant PMH:  DM II  COPD - pt states that she should be on oxygen, but it costs too much.  CAD  HTN  HLD  OA    Pain:  Pain Assessment  Pain Assessment: 0-10  0-10 (Numeric) Pain Score: 5 - Moderate pain  Pain Type: Chronic  pain  Pain Location: Back  Home Living:    Lives with:  daughter and grandson  Home type: House - 1 story  Stairs: No  Occupation: retired    Objective   Posture:  Posture Comment: Pt stands with forward flexed trunk posture and wide JEREMIAH  Gait:  Gait Comment: Pt ambulates into the clinic indep without AD. She ambulates with flexed trunk posture, wide JEREMIAH, slow bushra, she deviates from her path greatly, and the pt reaches for surfaces and walls to steady herself.  Transfers:  Transfers Comment: Pt utilizes her UEs during sit to stand transfers and demoes moderate trunk sway upon standing  Other:  Comment: The pt mentions intermittent dizziness with positional changes throughout the session - she reports that she see's her cardiologist in a few weeks and it was suggested that she discuss this with them.    LQ AROM Range   Lumbar Flexion 50% limited   Lumbar Extension 75% limited   Lumbar R sidebend 75% limited   Lumbar L sidebend 75% limited     LE MMT L R   Hip flexion 4/5 4/5   Hip IR 4/5 4/5   Hip ER 4/5 4/5   Knee flexion 4+/5 4+/5   Knee extension 5/5 5/5   Ankle DF 5/5 5/5   Ankle PF 5/5 5/5      Outcome Measures:  Tinetti  Sitting Balance: Steady, safe  Arises: Able, uses arms to help  Attempts to Arise: Able to arise, one attempt  Immediate Standing Balance (First 5 Seconds): Steady but uses walker or other support (braces legs againts mat table)  Standing Balance: Narrow stance without support  Nudged: Steady without walker or other support  Eyes Closed: Unsteady  Turned 360 Degrees: Steadiness: Steady  Turned 360 Degrees: Continuity of Steps: Discontinuous steps  Sitting Down: Uses arms or not a smooth motion  Balance Score: 11  Initiation of Gait: No hesitancy  Step Height: R Swing Foot: Right foot complete clears floor  Step Length: R Swing Foot: Passes left stance foot  Step Height: L Swing Foot: Left foot complete clears floor  Step Length: L Swing Foot: Passes right stance foot  Step Symmetry: Right  and left step appear equal  Step Continuity: Steps appear continuous  Path: Marked deviation  Trunk: No sway but flexion of knees or back or spreads arms out while walking  Walking Time: Heels almost touching while walking  Gait Score: 9  Total Score: 20    Other Measures  5x Sit to Stand: 25.70  Activities - Specific Balance Confidence Scale: 50.6     Assessment:  PT Assessment Results: Decreased strength, Decreased range of motion, Impaired balance, Decreased mobility, Decreased safety awareness, Pain  Rehab Prognosis: Fair    Pt is a 73 y.o. female who presents to the clinic d/t repeated falls and gait and balance concerns. The current impairments, listed above, have led to functional limitations that include: walking and performing typical daily tasks. The pt is a questionable historian, with variations in subjective info occurring throughout the session. The pts POC may be complicated by pts reported dizziness, which she believes may be related to her BP meds and is planning to discuss this with her cardiologist at an upcoming appointment. Pt would benefit from skilled physical therapy intervention to improve impairments and facilitate return to prior function.    Complexity of Evaluation: Low    Based on the history including personal factors and/or comorbidities, examination of body systems including body structures and function, activity limitations, and/or participation restrictions, as well as clinical presentation, patient meets criteria for a Low complexity evaluation.    Plan:  Treatment/Interventions: Cryotherapy, Education/ Instruction, Gait training, Hot pack, Manual therapy, Neuromuscular re-education, Therapeutic activities, Therapeutic exercises  PT Plan: Skilled PT  PT Frequency: 1 time per week  Duration: 10 visits  Onset Date: 10/03/24  Certification Period Start Date: 10/03/24  Certification Period End Date: 01/01/25  Number of Treatments Authorized: [place] cap  Rehab Potential: Fair  Plan of  "Care Agreement: Patient    Plan for next visit: Gait training with FWW vs rollator, provide fall prevention handout/info, LE strengthening, and balance exercises.     OP EDUCATION:  Outpatient Education  Individual(s) Educated: Patient  Education Provided: Fall Risk, POC (education on obtaining an AD, such as a rollator or FWW.)  Risk and Benefits Discussed with Patient/Caregiver/Other: yes  Patient/Caregiver Demonstrated Understanding: yes  Plan of Care Discussed and Agreed Upon: yes  Patient Response to Education: Patient/Caregiver Verbalized Understanding of Information, Patient/Caregiver Asked Appropriate Questions    Today's Treatment:  No treatment billed today  Education provided on the importance of use of an assistive device, such as a FWW or a rollator in order to improve safety with ambulation and reduce risk of falls.     Goals:  Active       PT Problem       PT STG       Start:  10/03/24    Expected End:  11/17/24       - Pt will complete the HEP with <3 verbal cues for correction,   - Pt will demonstrate 2pt improvement on the NPRS, allowing for improved tolerance of functional activities,   - Pt will demonstrate >4/5 MMT grading throughout LE musculature, allowing for appropriate muscle recruitment during daily activities.          PT LTG       Start:  10/03/24    Expected End:  01/01/25       - Pt will be independent in HEP & symptom management,   - Pt will demonstrate 4pt improvement on the NPRS pain scale, allowing for improved tolerance of functional activities.,   - Pt will improve 5x sit to stand to <16sec in order to demonstrate decreased risk of falls,   - Pt will demonstrate ability to ambulate at least 350' with least restrictive/no device without breaks or increases in pain in order to demonstrate improved tolerance to prolonged ambulation.  - Pt will demonstrate ability to perform 5 step ups to 8\" step leading with each LE and with 1 UE support in order to demonstrate improved ability to " navigate stairs,   - Pt will demonstrate improved Tinetti to at least 24pts in order to demonstrate low fall risk.

## 2024-10-15 ENCOUNTER — APPOINTMENT (OUTPATIENT)
Dept: CARDIOLOGY | Facility: CLINIC | Age: 74
End: 2024-10-15
Payer: MEDICARE

## 2024-10-21 ENCOUNTER — APPOINTMENT (OUTPATIENT)
Dept: CARDIOLOGY | Facility: CLINIC | Age: 74
End: 2024-10-21
Payer: MEDICARE

## 2024-10-21 VITALS
BODY MASS INDEX: 25.87 KG/M2 | HEIGHT: 63 IN | WEIGHT: 146 LBS | RESPIRATION RATE: 18 BRPM | SYSTOLIC BLOOD PRESSURE: 106 MMHG | HEART RATE: 58 BPM | DIASTOLIC BLOOD PRESSURE: 60 MMHG

## 2024-10-21 DIAGNOSIS — R07.9 CHEST PAIN IN ADULT: ICD-10-CM

## 2024-10-21 DIAGNOSIS — E78.1 PURE HYPERGLYCERIDEMIA: ICD-10-CM

## 2024-10-21 DIAGNOSIS — Z95.5 PRESENCE OF STENT IN CORONARY ARTERY: ICD-10-CM

## 2024-10-21 DIAGNOSIS — E78.00 PURE HYPERCHOLESTEROLEMIA: ICD-10-CM

## 2024-10-21 DIAGNOSIS — I73.9 PAD (PERIPHERAL ARTERY DISEASE) (CMS-HCC): ICD-10-CM

## 2024-10-21 DIAGNOSIS — Z95.9 S/P ARTERIAL STENT: Primary | ICD-10-CM

## 2024-10-21 DIAGNOSIS — R00.2 PALPITATIONS: ICD-10-CM

## 2024-10-21 PROCEDURE — 93000 ELECTROCARDIOGRAM COMPLETE: CPT | Performed by: INTERNAL MEDICINE

## 2024-10-21 PROCEDURE — 1126F AMNT PAIN NOTED NONE PRSNT: CPT | Performed by: INTERNAL MEDICINE

## 2024-10-21 PROCEDURE — 3074F SYST BP LT 130 MM HG: CPT | Performed by: INTERNAL MEDICINE

## 2024-10-21 PROCEDURE — 1159F MED LIST DOCD IN RCRD: CPT | Performed by: INTERNAL MEDICINE

## 2024-10-21 PROCEDURE — 1036F TOBACCO NON-USER: CPT | Performed by: INTERNAL MEDICINE

## 2024-10-21 PROCEDURE — 99214 OFFICE O/P EST MOD 30 MIN: CPT | Performed by: INTERNAL MEDICINE

## 2024-10-21 PROCEDURE — 3078F DIAST BP <80 MM HG: CPT | Performed by: INTERNAL MEDICINE

## 2024-10-21 PROCEDURE — 1160F RVW MEDS BY RX/DR IN RCRD: CPT | Performed by: INTERNAL MEDICINE

## 2024-10-21 PROCEDURE — 3044F HG A1C LEVEL LT 7.0%: CPT | Performed by: INTERNAL MEDICINE

## 2024-10-21 RX ORDER — CARVEDILOL 3.12 MG/1
6.25 TABLET ORAL 2 TIMES DAILY
Qty: 360 TABLET | Refills: 3 | Status: SHIPPED | OUTPATIENT
Start: 2024-10-21 | End: 2025-10-21

## 2024-10-21 ASSESSMENT — ENCOUNTER SYMPTOMS
OCCASIONAL FEELINGS OF UNSTEADINESS: 0
LOSS OF SENSATION IN FEET: 0
DEPRESSION: 0

## 2024-10-21 ASSESSMENT — PAIN SCALES - GENERAL: PAINLEVEL_OUTOF10: 0-NO PAIN

## 2024-10-21 ASSESSMENT — PATIENT HEALTH QUESTIONNAIRE - PHQ9
2. FEELING DOWN, DEPRESSED OR HOPELESS: NOT AT ALL
1. LITTLE INTEREST OR PLEASURE IN DOING THINGS: NOT AT ALL
SUM OF ALL RESPONSES TO PHQ9 QUESTIONS 1 AND 2: 0

## 2024-10-21 NOTE — PROGRESS NOTES
Provider ordered EKG. Consent obtained from Pt. EKG performed per office protocol. Successful EKG captured and printed. Printout given to provider.

## 2024-10-21 NOTE — PROGRESS NOTES
History of present illness:     73 year old female patient here today following up on hx of CAD status post PCI to RAMUS and LCX. she had failed  of RCA at .  Cardiac catheterization on 12/22/2020 status post PCI to LCX with WENDY. 2D Echo on 01/11/2021 showed mild MR with EF of 50-55%.  Patient had stress test and echo in 2022 that overall were stable.  Patient has history also of COPD hypertension hyperlipidemia and diabetes.  Patient returns to my office for follow-up.  Has been having issues with her balance and apparently her blood pressure has been running on the low side.  Patient has been losing weight lately intentional.  Feeling overall otherwise good denies having chest pain shortness of breath palpitations dizziness lightheadedness or syncope.  Past Medical History:   Diagnosis Date    Carotid stenosis 08/14/2018    Formatting of this note might be different from the original. Per [facility] office visit 8-13-18    Coronary artery disease involving native coronary artery of native heart without angina pectoris 10/11/2018    Diabetes mellitus type 2, controlled, without complications (Multi) 08/28/2023    DVT (deep venous thrombosis) (Multi) 08/23/2019    Formatting of this note might be different from the original. H/o DVT 2008 per [facility] office visit 8-19-19    Essential hypertension 08/28/2023    Hyperlipidemia 08/28/2023    Other forms of angina pectoris 04/15/2020    Formatting of this note might be different from the original. Per Dr. Blank office visit 4-6-20    PAD (peripheral artery disease) (CMS-[facility]) 01/15/2018    Formatting of this note might be different from the original. Per [facility] office visit 8-19-19    Palpitations 08/20/2020    Formatting of this note might be different from the original. Per Dr. Blank office visit 8-12-20    Personal history of other diseases of the circulatory system 03/11/2019    History of peripheral arterial disease    Personal history of other endocrine, nutritional  and metabolic disease 03/11/2019    History of diabetes mellitus    Personal history of transient ischemic attack (TIA), and cerebral infarction without residual deficits 02/19/2019    History of cerebrovascular accident    Presence of stent in coronary artery 08/28/2023    Pure hypercholesterolemia 08/23/2019    Formatting of this note might be different from the original. Per [facility] office visit 8-19-19    Pure hyperglyceridemia 04/01/2019    SOB (shortness of breath) 01/08/2018    Formatting of this note might be different from the original. Will schedule Lexiscan Myoview to evaluate CAD and angina symptoms. (patient unable to perform exercise myoview due to SOB) per Dr. Blank office visit 12/19/17    Stage 1 mild COPD by GOLD classification (Multi) 08/23/2019    Formatting of this note might be different from the original. Per [facility] office visit 8-19-19       Past Surgical History:   Procedure Laterality Date    BI STEREOTACTIC GUIDED BREAST LEFT LOCALIZATION AND BIOPSY Left 12/16/2020    BI STEREOTACTIC GUIDED BREAST LOCALIZATION AND BIOPSY LEFT [facility]    BI US GUIDED BREAST LOCALIZATION AND BIOPSY LEFT Left 11/17/2016    BI US GUIDED BREAST LOCALIZATION AND BIOPSY LEFT [facility]    BREAST BIOPSY Left     CATARACT EXTRACTION W/  INTRAOCULAR LENS IMPLANT Bilateral     OTHER SURGICAL HISTORY  02/19/2019    Carpal tunnel surgery    OTHER SURGICAL HISTORY  02/19/2019    Back surgery    OTHER SURGICAL HISTORY  02/19/2019    [place] filter placement    OTHER SURGICAL HISTORY  02/19/2019    Elbow surgery    OTHER SURGICAL HISTORY  02/19/2019    Internal carotid thromboendarterectomy    OTHER SURGICAL HISTORY  02/19/2019    Arterial stent placement       Allergies   Allergen Reactions    Metoprolol Shortness of breath and Unknown    Other Other    Albumin Products Unknown    Haemophilus Influenzae Type B Unknown    Hydrochlorothiazide Unknown    Latex Unknown and Hives    Nicotine Unknown and  Hives     Nicotine patches    Pollen Extracts Other     Congestion, itchy eyes, runny nose    Adhesive Tape-Silicones Hives    Flu Vaccine 2012-13(3 Yr+)(Pf) Other    Valsartan Other        reports that she quit smoking about 16 years ago. Her smoking use included cigarettes. She has never been exposed to tobacco smoke. She has never used smokeless tobacco. She reports that she does not currently use alcohol. She reports that she does not currently use drugs.    Family History   Problem Relation Name Age of Onset    Emphysema Mother      Heart disease Mother      Stroke Mother      Asthma Mother      Brain cancer Father      Liver cancer Father      Skin cancer Father      Lung cancer Father      Cancer Father      Hypertension Brother      Diabetes Brother      Watts's esophagus Brother      No Known Problems Brother      Heart disease Maternal Grandmother      Cancer Maternal Grandfather         Patient's Medications   New Prescriptions    No medications on file   Previous Medications    ACETAMINOPHEN (TYLENOL) 500 MG CAPSULE    1 capsule as needed Orally every 8 hours    ALBUTEROL (PROVENTIL HFA) 90 MCG/ACTUATION INHALER    2 puffs Inhaler every 4 hours    ASPIRIN 81 MG EC TABLET    1 tablet Orally Once a day    ATENOLOL (TENORMIN) 25 MG TABLET    TAKE 1 TABLET DAILY.    ATORVASTATIN (LIPITOR) 80 MG TABLET    1 tablet Orally Once a day for 30 day(s)    BUDESONIDE-GLYCOPYR-FORMOTEROL (BREZTRI AEROSPHERE) 160-9-4.8 MCG/ACTUATION HFA AEROSOL INHALER    Inhale 2 puffs twice a day.    CALCIUM CARBONATE-SIMETHICONE (MAALOX ADVANCED) 1,000-60 MG TABLET,CHEWABLE    Chew 1 tablet every 6 hours if needed.    CALCIUM CARBONATE-VITAMIN D3 (CALTRATE WITH VITAMIN D3) 600 MG-20 MCG (800 UNIT) TABLET    Take 1 tablet daily    CALCIUM CARBONATE/VITAMIN D2 (CALCIUM CARBONATE-VITAMIN D PO)    Calcium Carbonate-Vitamin D    CARVEDILOL (COREG) 3.125 MG TABLET    1 tablet with food Orally Twice a day    CETIRIZINE HCL (ZYRTEC ORAL)     Zyrtec    CLOPIDOGREL (PLAVIX) 75 MG TABLET    1 tablet Orally Once a day    COENZYME Q-10 10 MG CAPSULE    TAKE 1 CAPSULE 2-3 TIMES DAILY AS DIRECTED.    DILTIAZEM CD (CARTIA XT) 240 MG 24 HR CAPSULE    1 capsule Orally Once a day for 90    EMPAGLIFLOZIN (JARDIANCE) 10 MG    1 tablet Orally Once a day for 30 day(s)    FEXOFENADINE HCL (ALLEGRA ORAL)    TAKE 1 CAPSULE TWICE DAILY AS NEEDED.    FLUTICASONE FUROATE-VILANTEROL (BREO ELLIPTA) 200-25 MCG/DOSE INHALER    Inhalation Once a day    FLUTICASONE PROPIONATE (FLONASE NASL)    Flonase    FUROSEMIDE (LASIX) 20 MG TABLET    TAKE 1 TABLET DAILY.    GABAPENTIN (NEURONTIN) 300 MG CAPSULE    Take 1 capsule by mouth three times daily for 90 days.    GLIMEPIRIDE (AMARYL) 1 MG TABLET    TAKE 1 TABLET BY MOUTH EVERY DAY WITH BREAKFAST    HYDROCHLOROTHIAZIDE (HYDRODIURIL) 25 MG TABLET    1 tablet in the morning Orally Once a day for 90    ISOSORBIDE MONONITRATE ER (IMDUR) 120 MG 24 HR TABLET    1 tablet in the morning Orally Once a day for 90    METFORMIN (GLUCOPHAGE) 500 MG TABLET    1 tablet with meals Orally Twice a day    METHIMAZOLE (TAPAZOLE) 5 MG TABLET    TAKE 1 TABLET BY MOUTH EVERY DAY    NITROFURANTOIN, MACROCRYSTAL-MONOHYDRATE, (MACROBID) 100 MG CAPSULE    1 capsule with food Orally every 12 hrs    NITROGLYCERIN (NITROSTAT) 0.4 MG SL TABLET    1 tablet Sublingual prn for chest pain for 30 days    NUTRITIONAL SUPPLEMENTS ORAL    Azo- taking twice a day    OMEGA-3 FATTY ACIDS-FISH OIL (FISH OIL) 300-500 MG CAPSULE    1 capsule Orally daily    PANTOPRAZOLE (PROTONIX) 40 MG EC TABLET    1 tablet Orally Once a day for 30 day(s)    POTASSIUM CHLORIDE CR (KLOR-CON M20) 20 MEQ ER TABLET    TAKE 1 TABLET BY MOUTH TWICE A DAY    RED YEAST RICE 600 MG TABLET    TAKE TABLET    ROSUVASTATIN (CRESTOR) 40 MG TABLET    Take 1 tablet (40 mg) by mouth once daily.    TAMSULOSIN (FLOMAX) 0.4 MG 24 HR CAPSULE    Take 1 capsule (0.4 mg) by mouth once daily.    TRIAMCINOLONE  (KENALOG) 0.1 % CREAM    Kenalog Cream 0.1%   Modified Medications    No medications on file   Discontinued Medications    No medications on file       Objective   Physical Exam  General: Patient in no acute distress   HEENT: Atraumatic normocephalic.  Neck: Supple, jugular venous pressure within normal limit.  No bruits  Lungs: Clear to auscultation bilaterally  Cardiovascular: Regular rate and rhythm, normal heart sounds, no murmurs rubs or gallops  Abdomen: Soft nontender nondistended.  Normal bowel sounds.  Extremities: Warm to touch, no edema.    Lab Review   No visits with results within 2 Month(s) from this visit.   Latest known visit with results is:   Lab on 07/26/2024   Component Date Value    Glucose 07/26/2024 165 (H)     Sodium 07/26/2024 144     Potassium 07/26/2024 3.4     Chloride 07/26/2024 99     Bicarbonate 07/26/2024 27     Urea Nitrogen 07/26/2024 15     Creatinine 07/26/2024 1.00     eGFR 07/26/2024 59 (L)     Calcium 07/26/2024 10.1     Anion Gap 07/26/2024 18     AST 07/26/2024 12     ALT 07/26/2024 10     Alkaline Phosphatase 07/26/2024 69     Bilirubin, Total 07/26/2024 0.3     Bilirubin, Direct 07/26/2024 <0.2     Total Protein 07/26/2024 7.3     Albumin 07/26/2024 4.8     WBC 07/26/2024 13.2 (H)     nRBC 07/26/2024 0.0     RBC 07/26/2024 5.01     Hemoglobin 07/26/2024 12.0     Hematocrit 07/26/2024 40.4     MCV 07/26/2024 81     MCH 07/26/2024 24.0 (L)     MCHC 07/26/2024 29.7 (L)     RDW 07/26/2024 18.8 (H)     Platelets 07/26/2024 406         Assessment/Plan   Patient Active Problem List   Diagnosis    Arthritis    Coronary artery disease involving native coronary artery of native heart without angina pectoris    Other forms of angina pectoris    Stage 1 mild COPD by GOLD classification (Multi)    Diabetes (Multi)    Diabetes mellitus type 2, controlled, without complications (Multi)    Essential hypertension    GERD (gastroesophageal reflux disease)    Hyperlipidemia    Nodular thyroid  disease    Presence of stent in coronary artery    Subclinical hyperthyroidism    Abnormal TSH    Watts's esophagus without dysplasia    DM type 2, controlled, with complication (Multi)    DM type 2, controlled, with complication (Multi)    Carotid stenosis    DVT (deep venous thrombosis) (Multi)    Eczema    S/P arterial stent    Incomplete emptying of bladder    Lichen planus    PAD (peripheral artery disease) (CMS-HCC)    Palpitations    Primary osteoarthritis of both hips    Pure hypercholesterolemia    Pure hyperglyceridemia    SOB (shortness of breath)    Back problem      73 year old female patient here today following up on hx of CAD status post PCI to RAMUS and LCX. she had failed  of RCA at .  Cardiac catheterization on 12/22/2020 status post PCI to LCX with WENDY. 2D Echo on 01/11/2021 showed mild MR with EF of 50-55%.  Patient had stress test and echo in 2022 that overall were stable.  Patient has history also of COPD hypertension hyperlipidemia and diabetes.  Patient returns to my office for follow-up.  Has been having issues with her balance and apparently her blood pressure has been running on the low side.  Patient has been losing weight lately intentional.  Feeling overall otherwise good denies having chest pain shortness of breath palpitations dizziness lightheadedness or syncope.  EKG today showed sinus tachycardia with PVCs and left bundle branch morphology which has been chronic.  Blood pressure running on the low side.  At this point I am going to stop hydrochlorothiazide.  Continue isosorbide and will increase her carvedilol to 6.25 mg oral twice daily for better heart rate control as well as PVCs.  She is also on diltiazem 240 mg oral daily.  Will monitor.  Follow-up in 6 months.  Patient to track her blood pressure and let me know if she remains dizzy or her blood pressure low will decrease her isosorbide.  Continue aspirin clopidogrel.  Labs reviewed LDL at target.  Will follow-up in 4  months.  Soledad Blank MD

## 2024-10-21 NOTE — PATIENT INSTRUCTIONS
Stop hydrochlorothiazide.  Increase carvedilol to 6.25 mg oral twice daily.  Continue other medications.  Monitor your blood pressure if it remains low let me know.

## 2024-10-28 ENCOUNTER — TELEPHONE (OUTPATIENT)
Dept: CARDIOLOGY | Facility: CLINIC | Age: 74
End: 2024-10-28
Payer: MEDICARE

## 2024-10-30 ENCOUNTER — TREATMENT (OUTPATIENT)
Dept: PHYSICAL THERAPY | Facility: CLINIC | Age: 74
End: 2024-10-30
Payer: MEDICARE

## 2024-10-30 DIAGNOSIS — R26.89 OTHER ABNORMALITIES OF GAIT AND MOBILITY: ICD-10-CM

## 2024-10-30 DIAGNOSIS — R29.6 REPEATED FALLS: ICD-10-CM

## 2024-10-30 PROCEDURE — 97110 THERAPEUTIC EXERCISES: CPT | Mod: GP

## 2024-10-30 ASSESSMENT — PAIN SCALES - GENERAL: PAINLEVEL_OUTOF10: 0 - NO PAIN

## 2024-11-05 ENCOUNTER — HOSPITAL ENCOUNTER (OUTPATIENT)
Dept: RADIOLOGY | Facility: CLINIC | Age: 74
Discharge: HOME | End: 2024-11-05
Payer: MEDICARE

## 2024-11-05 VITALS — WEIGHT: 145 LBS | BODY MASS INDEX: 25.69 KG/M2

## 2024-11-05 DIAGNOSIS — Z12.31 ENCOUNTER FOR SCREENING MAMMOGRAM FOR MALIGNANT NEOPLASM OF BREAST: ICD-10-CM

## 2024-11-05 PROCEDURE — 77067 SCR MAMMO BI INCL CAD: CPT | Performed by: STUDENT IN AN ORGANIZED HEALTH CARE EDUCATION/TRAINING PROGRAM

## 2024-11-05 PROCEDURE — 77063 BREAST TOMOSYNTHESIS BI: CPT | Performed by: STUDENT IN AN ORGANIZED HEALTH CARE EDUCATION/TRAINING PROGRAM

## 2024-11-05 PROCEDURE — 77067 SCR MAMMO BI INCL CAD: CPT

## 2024-11-13 ENCOUNTER — APPOINTMENT (OUTPATIENT)
Dept: PHYSICAL THERAPY | Facility: CLINIC | Age: 74
End: 2024-11-13
Payer: MEDICARE

## 2024-11-13 NOTE — PROGRESS NOTES
Physical Therapy Treatment    Patient Name: Maia Gunderson  MRN: [id number]  Today's Date: 11/13/2024             Visit Number:  3 (including evaluation)  Planned total visits: 10  Visit Authorized/ Insurance Considerations:  MEDICARE A/B, [place], [place] AUTH ( $0 USED PT/ST)     Current Problem  Problem List Items Addressed This Visit    None        Precautions       Pain       Subjective/General:      ***    Objective  Pt ambulates into the clinic indep, but utilizes the wall to hold onto to maintain balance.     Treatments:  Ther-ex:  - Nustep (seat 6) L1 x6mins  Standing at bar with UE support PRN x15 reps ea  - Heel and toe raises  - Hip 3 way  - March  - Hamstring curl  - Minisquat    Gait:  - Educated on proper fit for FWW vs rollator   - Ambulated 40 ft mod I with FWW and cues for sequencing and upright posture    Current HEP:  Access Code: [email or web address]  URL: [email or web address]  Date: 10/30/2024  Prepared by: Susan Nguyen    Program Notes  Perform at the kitchen sink with a dining chair next to or behind you. Rest as needed throughout the program.    Exercises  - Heel Raises with Counter Support  - 1-2 x daily - 7 x weekly - 1-3 sets - 10 reps  - Heel Toe Raises with Counter Support  - 1-2 x daily - 7 x weekly - 1-3 sets - 10 reps  - Standing Hip Flexion with Counter Support  - 1-2 x daily - 7 x weekly - 1-3 sets - 10 reps  - Standing Hip Abduction with Counter Support  - 1-2 x daily - 7 x weekly - 1-3 sets - 10 reps  - Standing Hip Extension with Counter Support  - 1-2 x daily - 7 x weekly - 1-3 sets - 10 reps  - Standing March with Counter Support  - 1-2 x daily - 7 x weekly - 1-3 sets - 10 reps  - Standing Knee Flexion with Counter Support  - 1-2 x daily - 7 x weekly - 1-3 sets - 10 reps  - Mini Squat with Counter Support  - 1-2 x daily - 7 x weekly - 1-3 sets - 10 reps    OP Education:    Reinforced education on obtaining a rollator or FWW for safety with  "ambulation.    Assessment:     Pt's response to treatment:  Fair - ***   Areas of improvements:  initiated standing strengthening  Limitations/deficits:  SOB    Pain end of session:  ***/10    Plan:     Continue with current POC/no changes    Assessment of current progress against goals:  Insufficient treatment time to assess progress  Goals:  Active       PT Problem       PT STG       Start:  10/03/24    Expected End:  11/17/24       - Pt will complete the HEP with <3 verbal cues for correction,   - Pt will demonstrate 2pt improvement on the NPRS, allowing for improved tolerance of functional activities,   - Pt will demonstrate >4/5 MMT grading throughout LE musculature, allowing for appropriate muscle recruitment during daily activities.          PT LTG       Start:  10/03/24    Expected End:  01/01/25       - Pt will be independent in HEP & symptom management,   - Pt will demonstrate 4pt improvement on the NPRS pain scale, allowing for improved tolerance of functional activities.,   - Pt will improve 5x sit to stand to <16sec in order to demonstrate decreased risk of falls,   - Pt will demonstrate ability to ambulate at least 350' with least restrictive/no device without breaks or increases in pain in order to demonstrate improved tolerance to prolonged ambulation.  - Pt will demonstrate ability to perform 5 step ups to 8\" step leading with each LE and with 1 UE support in order to demonstrate improved ability to navigate stairs,   - Pt will demonstrate improved Tinetti to at least 24pts in order to demonstrate low fall risk.             "

## 2024-11-20 ENCOUNTER — TREATMENT (OUTPATIENT)
Dept: PHYSICAL THERAPY | Facility: CLINIC | Age: 74
End: 2024-11-20
Payer: MEDICARE

## 2024-11-20 DIAGNOSIS — R29.6 REPEATED FALLS: ICD-10-CM

## 2024-11-20 DIAGNOSIS — R26.89 OTHER ABNORMALITIES OF GAIT AND MOBILITY: ICD-10-CM

## 2024-11-20 PROCEDURE — 97112 NEUROMUSCULAR REEDUCATION: CPT | Mod: GP

## 2024-11-20 PROCEDURE — 97110 THERAPEUTIC EXERCISES: CPT | Mod: GP

## 2024-11-20 ASSESSMENT — PAIN SCALES - GENERAL: PAINLEVEL_OUTOF10: 4

## 2024-11-20 NOTE — PROGRESS NOTES
"    Physical Therapy Treatment    Patient Name: Maia Gunderson  MRN: [id number]  Today's Date: 11/20/2024  Time Calculation  Start Time: 0930  Stop Time: 1010  Time Calculation (min): 40 min     PT Therapeutic Procedures Time Entry  Neuromuscular Re-Education Time Entry: 10  Therapeutic Exercise Time Entry: 30    Visit Number:  3 (including evaluation)  Planned total visits: 10  Visit Authorized/ Insurance Considerations:  MEDICARE A/B, [place], [place], [place] AUTH ( $0 USED PT/ST)     Current Problem  Problem List Items Addressed This Visit    None  Visit Diagnoses         Codes    Repeated falls     R29.6    Other abnormalities of gait and mobility     R26.89              Precautions  Precautions  Medical Precautions: Fall precautions    Pain  0-10 (Numeric) Pain Score: 4 - mainly her back    Subjective/General:  Reason for Referral: Repeated falls  Referred By: Jersey Bowers   The pt returns to the clinic stating that she did try the HEP, but wasn't feeling confident that she was performing it correctly, especially noting difficulty with squats. Requests review.     Objective  Pt ambulates into the clinic indep, but utilizes the wall to hold onto to maintain balance.   Good Romberg balance with EO  Fair Romberg balance with EC     Treatments:  Ther-ex:  - Recumbent Bike  (seat 12) L2 x5mins  Standing at bar with UE support PRN x15 reps ea - cues for form and diaphragmatic breathing throughout  - Heel and toe raises  - Hip 3 way  - March  - Hamstring curl  - Minisquat - max verbal and tactile cues for form  - Sit to stand - no Ues x10     Neuro Re-ed:  Flat surface:  Romberg EO/EC x30\" ea  Tandem stance x30\" leading with ea LE    Current HEP:  Access Code: [email or web address]  URL: [email or web address]  Date: 10/30/2024  Prepared by: Susan [employer]    Program Notes  Perform at the kitchen sink with a dining chair next to or behind you. Rest as needed throughout the program.    Exercises  - Heel Raises with " "Counter Support  - 1-2 x daily - 7 x weekly - 1-3 sets - 10 reps  - Heel Toe Raises with Counter Support  - 1-2 x daily - 7 x weekly - 1-3 sets - 10 reps  - Standing Hip Flexion with Counter Support  - 1-2 x daily - 7 x weekly - 1-3 sets - 10 reps  - Standing Hip Abduction with Counter Support  - 1-2 x daily - 7 x weekly - 1-3 sets - 10 reps  - Standing Hip Extension with Counter Support  - 1-2 x daily - 7 x weekly - 1-3 sets - 10 reps  - Standing March with Counter Support  - 1-2 x daily - 7 x weekly - 1-3 sets - 10 reps  - Standing Knee Flexion with Counter Support  - 1-2 x daily - 7 x weekly - 1-3 sets - 10 reps  - Mini Squat with Counter Support  - 1-2 x daily - 7 x weekly - 1-3 sets - 10 reps    OP Education:    Reinforced education on obtaining a rollator or FWW for safety with ambulation.    Assessment:     Pt's response to treatment:  Fair - The pt had difficulty with proper form of mini squats, but was very receptive to cues and was able to improve performance throughout. The pt reported improved confidence with home performance by the end of the session.   Areas of improvements:  initiated standing strengthening  Limitations/deficits:  SOB    Pain end of session:  5-6/10 \"soreness\" in the knees    Plan:  OP PT Plan  Treatment/Interventions: Cryotherapy, Education/ Instruction, Gait training, Hot pack, Manual therapy, Neuromuscular re-education, Therapeutic activities, Therapeutic exercises  PT Plan: Skilled PT  PT Frequency: 1 time per week  Duration: 10 visits  Onset Date: 10/03/24  Certification Period Start Date: 10/03/24  Certification Period End Date: 01/01/25  [place] of Treatments [place]ized[place]ARA [place]  Rehab Potential: Fair  Plan of Care Agreement: Patient  Continue with current POC/no changes --next session: review squat form and sit to stand, progress balance exercises.     Assessment of current progress against goals:  Insufficient treatment time to assess progress  Goals:  Active       PT " "Problem       PT STG       Start:  10/03/24    Expected End:  11/17/24       - Pt will complete the HEP with <3 verbal cues for correction,   - Pt will demonstrate 2pt improvement on the NPRS, allowing for improved tolerance of functional activities,   - Pt will demonstrate >4/5 MMT grading throughout LE musculature, allowing for appropriate muscle recruitment during daily activities.          PT LTG       Start:  10/03/24    Expected End:  01/01/25       - Pt will be independent in HEP & symptom management,   - Pt will demonstrate 4pt improvement on the NPRS pain scale, allowing for improved tolerance of functional activities.,   - Pt will improve 5x sit to stand to <16sec in order to demonstrate decreased risk of falls,   - Pt will demonstrate ability to ambulate at least 350' with least restrictive/no device without breaks or increases in pain in order to demonstrate improved tolerance to prolonged ambulation.  - Pt will demonstrate ability to perform 5 step ups to 8\" step leading with each LE and with 1 UE support in order to demonstrate improved ability to navigate stairs,   - Pt will demonstrate improved Tinetti to at least 24pts in order to demonstrate low fall risk.             "

## 2024-11-26 NOTE — PROGRESS NOTES
"    Physical Therapy Treatment    Patient Name: Maia Gunderson  MRN: [id number]  Today's Date: 11/26/2024             Visit Number:  4 (including evaluation)  Planned total visits: 10  Visit Authorized/ Insurance Considerations:  MEDICARE A/B, [place] group home, [place], [place] AUTH ( $0 USED PT/ST)     Current Problem  Problem List Items Addressed This Visit    None          Precautions       Pain       Subjective/General:      ***    Objective  Pt ambulates into the clinic indep, but utilizes the wall to hold onto to maintain balance.   Good Romberg balance with EO  Fair Romberg balance with EC     Treatments:  Ther-ex:  - Recumbent Bike  (seat 12) L2 x5mins  Standing at bar with UE support PRN x15 reps ea - cues for form and diaphragmatic breathing throughout  - Heel and toe raises  - Hip 3 way  - March  - Hamstring curl  - Minisquat - max verbal and tactile cues for form  - Sit to stand - no Ues x10     Neuro Re-ed:  Flat surface:  Romberg EO/EC x30\" ea  Tandem stance x30\" leading with ea LE    ***    Current HEP:  Access Code: [email or web address]  URL: [email or web address]  Date: 10/30/2024  Prepared by: Susan [employer]    Program Notes  Perform at the kitchen sink with a dining chair next to or behind you. Rest as needed throughout the program.    Exercises  - Heel Raises with Counter Support  - 1-2 x daily - 7 x weekly - 1-3 sets - 10 reps  - Heel Toe Raises with Counter Support  - 1-2 x daily - 7 x weekly - 1-3 sets - 10 reps  - Standing Hip Flexion with Counter Support  - 1-2 x daily - 7 x weekly - 1-3 sets - 10 reps  - Standing Hip Abduction with Counter Support  - 1-2 x daily - 7 x weekly - 1-3 sets - 10 reps  - Standing Hip Extension with Counter Support  - 1-2 x daily - 7 x weekly - 1-3 sets - 10 reps  - Standing March with Counter Support  - 1-2 x daily - 7 x weekly - 1-3 sets - 10 reps  - Standing Knee Flexion with Counter Support  - 1-2 x daily - 7 x weekly - 1-3 sets - 10 reps  - Mini Squat with Counter " "Support  - 1-2 x daily - 7 x weekly - 1-3 sets - 10 reps    OP Education:    Reinforced education on obtaining a rollator or FWW for safety with ambulation.    Assessment:     Pt's response to treatment:  Fair - ***  Areas of improvements:  initiated standing strengthening  Limitations/deficits:  SOB    Pain end of session: ***/10    Plan:     Continue with current POC/no changes --next session: review squat form and sit to stand, progress balance exercises.     Assessment of current progress against goals:  Insufficient treatment time to assess progress  Goals:  Active       PT Problem       PT STG       Start:  10/03/24    Expected End:  11/17/24       - Pt will complete the HEP with <3 verbal cues for correction,   - Pt will demonstrate 2pt improvement on the NPRS, allowing for improved tolerance of functional activities,   - Pt will demonstrate >4/5 MMT grading throughout LE musculature, allowing for appropriate muscle recruitment during daily activities.          PT LTG       Start:  10/03/24    Expected End:  01/01/25       - Pt will be independent in HEP & symptom management,   - Pt will demonstrate 4pt improvement on the NPRS pain scale, allowing for improved tolerance of functional activities.,   - Pt will improve 5x sit to stand to <16sec in order to demonstrate decreased risk of falls,   - Pt will demonstrate ability to ambulate at least 350' with least restrictive/no device without breaks or increases in pain in order to demonstrate improved tolerance to prolonged ambulation.  - Pt will demonstrate ability to perform 5 step ups to 8\" step leading with each LE and with 1 UE support in order to demonstrate improved ability to navigate stairs,   - Pt will demonstrate improved Tinetti to at least 24pts in order to demonstrate low fall risk.             "

## 2024-11-27 ENCOUNTER — APPOINTMENT (OUTPATIENT)
Dept: PHYSICAL THERAPY | Facility: CLINIC | Age: 74
End: 2024-11-27
Payer: MEDICARE

## 2024-12-04 ENCOUNTER — APPOINTMENT (OUTPATIENT)
Dept: PHYSICAL THERAPY | Facility: CLINIC | Age: 74
End: 2024-12-04
Payer: MEDICARE

## 2024-12-11 ENCOUNTER — TREATMENT (OUTPATIENT)
Dept: PHYSICAL THERAPY | Facility: CLINIC | Age: 74
End: 2024-12-11
Payer: MEDICARE

## 2024-12-11 DIAGNOSIS — R29.6 REPEATED FALLS: ICD-10-CM

## 2024-12-11 DIAGNOSIS — R26.89 OTHER ABNORMALITIES OF GAIT AND MOBILITY: ICD-10-CM

## 2024-12-11 PROCEDURE — 97110 THERAPEUTIC EXERCISES: CPT | Mod: GP

## 2024-12-11 PROCEDURE — 97112 NEUROMUSCULAR REEDUCATION: CPT | Mod: GP

## 2024-12-11 ASSESSMENT — PAIN SCALES - GENERAL: PAINLEVEL_OUTOF10: 5 - MODERATE PAIN

## 2024-12-11 NOTE — PROGRESS NOTES
"    Physical Therapy Treatment    Patient Name: Maia Gunderson  MRN: [id number]  Today's Date: 12/11/2024  Time Calculation  Start Time: 0930  Stop Time: 1012  Time Calculation (min): 42 min     PT Therapeutic Procedures Time Entry  Neuromuscular Re-Education Time Entry: 22  Therapeutic Exercise Time Entry: 20    Visit Number:  4 (including evaluation)  Planned total visits: 10  Visit Authorized/ Insurance Considerations:  MEDIC[place] A/B, [place], [place] AUTH ( $0 USED PT/ST)     Current Problem  Problem List Items Addressed This Visit    None  Visit Diagnoses         Codes    Repeated falls     R29.6    Other abnormalities of gait and mobility     R26.89                Precautions  Precautions  Medical Precautions: Fall precautions    Pain  0-10 (Numeric) Pain Score: 5 - Moderate pain    Subjective/General:  Reason for Referral: Repeated falls  Referred By: Jersey Bowers   The pt returns stating that she performs the exercises about every other day. She notes that she had to miss last week d/t an appliance delivery. She reports having had a fall in the deep snow a few days ago when trying to brush off an awning. She was unable    Objective  Pt ambulates into the clinic indep, but utilizes the wall to hold onto to maintain balance.   Fair to poor balance on unven surface with EC    Treatments:  Ther-ex:  - Nustep (seat 7) L2 x6mins  Standing at bar with UE support PRN x15 reps ea - cues for form and diaphragmatic breathing throughout  - Minisquat - max verbal and tactile cues for form  - Heel and toe raises  - Sit to stand - no Ues x10     Neuro Re-ed:  Flat surface:  Romberg EO/EC x30\" ea  Tandem stance x30\" leading with ea LE  Airex step ups x10 leaing with ea LE - 1 fingertip support    Airex romberg EO/EC x30\"ea  Airex neutral stance with head turns x10     Current HEP:  Access Code: [email or web address]  URL: [email or web address]  Date: 10/30/2024  Prepared by: Susan Nguyen    Program Notes  Perform " at the kitchen sink with a dining chair next to or behind you. Rest as needed throughout the program.    Exercises  - Heel Raises with Counter Support  - 1-2 x daily - 7 x weekly - 1-3 sets - 10 reps  - Heel Toe Raises with Counter Support  - 1-2 x daily - 7 x weekly - 1-3 sets - 10 reps  - Standing Hip Flexion with Counter Support  - 1-2 x daily - 7 x weekly - 1-3 sets - 10 reps  - Standing Hip Abduction with Counter Support  - 1-2 x daily - 7 x weekly - 1-3 sets - 10 reps  - Standing Hip Extension with Counter Support  - 1-2 x daily - 7 x weekly - 1-3 sets - 10 reps  - Standing March with Counter Support  - 1-2 x daily - 7 x weekly - 1-3 sets - 10 reps  - Standing Knee Flexion with Counter Support  - 1-2 x daily - 7 x weekly - 1-3 sets - 10 reps  - Mini Squat with Counter Support  - 1-2 x daily - 7 x weekly - 1-3 sets - 10 reps    OP Education:    Reinforced education on obtaining a rollator or FWW for safety with ambulation.    Assessment:     Pt's response to treatment:  Fair - The pt does struggle to maintain balance when on uneven surface, espcially when visual input is reduced   Areas of improvements:  initiated standing strengthening  Limitations/deficits:  SOB    Pain end of session: 0/10    Plan:  OP PT Plan  Treatment/Interventions: Cryotherapy, Education/ Instruction, Gait training, Hot pack, Manual therapy, Neuromuscular re-education, Therapeutic activities, Therapeutic exercises  PT Plan: Skilled PT  PT Frequency: 1 time per week  Duration: 10 visits  Onset Date: 10/03/24  Certification Period Start Date: 10/03/24  Certification Period End Date: 01/01/25  [place] of Treatments [place]ized[place]/SARA [place]  Rehab Potential: Fair  Plan of Care Agreement: Patient  Continue with current POC/no changes --next session: review squat form and sit to stand, progress balance exercises.     Assessment of current progress against goals:  Insufficient treatment time to assess progress  Goals:  Active       PT Problem        "PT STG       Start:  10/03/24    Expected End:  11/17/24       - Pt will complete the HEP with <3 verbal cues for correction,   - Pt will demonstrate 2pt improvement on the NPRS, allowing for improved tolerance of functional activities,   - Pt will demonstrate >4/5 MMT grading throughout LE musculature, allowing for appropriate muscle recruitment during daily activities.          PT LTG       Start:  10/03/24    Expected End:  01/01/25       - Pt will be independent in HEP & symptom management,   - Pt will demonstrate 4pt improvement on the NPRS pain scale, allowing for improved tolerance of functional activities.,   - Pt will improve 5x sit to stand to <16sec in order to demonstrate decreased risk of falls,   - Pt will demonstrate ability to ambulate at least 350' with least restrictive/no device without breaks or increases in pain in order to demonstrate improved tolerance to prolonged ambulation.  - Pt will demonstrate ability to perform 5 step ups to 8\" step leading with each LE and with 1 UE support in order to demonstrate improved ability to navigate stairs,   - Pt will demonstrate improved Tinetti to at least 24pts in order to demonstrate low fall risk.             "

## 2024-12-18 ENCOUNTER — TREATMENT (OUTPATIENT)
Dept: PHYSICAL THERAPY | Facility: CLINIC | Age: 74
End: 2024-12-18
Payer: MEDICARE

## 2024-12-18 DIAGNOSIS — R26.89 OTHER ABNORMALITIES OF GAIT AND MOBILITY: ICD-10-CM

## 2024-12-18 DIAGNOSIS — R29.6 REPEATED FALLS: ICD-10-CM

## 2024-12-18 PROCEDURE — 97110 THERAPEUTIC EXERCISES: CPT | Mod: GP

## 2024-12-18 PROCEDURE — 97112 NEUROMUSCULAR REEDUCATION: CPT | Mod: GP

## 2024-12-18 ASSESSMENT — PAIN SCALES - GENERAL: PAINLEVEL_OUTOF10: 0 - NO PAIN

## 2024-12-18 NOTE — PROGRESS NOTES
"    Physical Therapy Treatment    Patient Name: Maia Gunderson  MRN: [id number]  Today's Date: 12/18/2024  Time Calculation  Start Time: 0930  Stop Time: 1010  Time Calculation (min): 40 min     PT Therapeutic Procedures Time Entry  Neuromuscular Re-Education Time Entry: 20  Therapeutic Exercise Time Entry: 20    Visit Number:  5 (including evaluation)  Planned total visits: 10  Visit Authorized/ Insurance Considerations:  MEDICARE A/B, [place] [facility], [place], NO AUTH ( $0 USED PT/ST)     Current Problem  Problem List Items Addressed This Visit    None  Visit Diagnoses         Codes    Repeated falls     R29.6    Other abnormalities of gait and mobility     R26.89            Precautions  Precautions  Medical Precautions: Fall precautions    Pain  0-10 (Numeric) Pain Score: 0 - No pain    Subjective/General:  Reason for Referral: Repeated falls  Referred By: Jersey Bowers   The pt returns to the clinic stating that she is not having pain yet this morning.    Objective  Pt ambulates into the clinic indep, but utilizes the wall to hold onto to maintain balance.   Fair to poor balance on unven surface with EC    Treatments:  Ther-ex:  - Nustep (seat 7) L2 x7mins  Standing at bar with UE support PRN x15 reps ea - cues for form and diaphragmatic breathing throughout  - Minisquat - max verbal and tactile cues for form x12  - Heel and toe raises x10 ea  - Sit to stand - no Ues x10     Neuro Re-ed:  Flat surface:  Romberg on airex  EO/EC x30\" ea  Tandem stance on Airex x30\" leading with ea LE  Airex step ups x10 leaing with ea LE - 1 fingertip support    Airex marches x20   Airex neutral stance with head turns x10     Current HEP:  Access Code: [email or web address]  URL: [email or web address]  Date: 10/30/2024  Prepared by: Susan [employer]    Program Notes  Perform at the kitchen sink with a dining chair next to or behind you. Rest as needed throughout the program.    Exercises  - Heel Raises with Counter Support  - 1-2 x " daily - 7 x weekly - 1-3 sets - 10 reps  - Heel Toe Raises with Counter Support  - 1-2 x daily - 7 x weekly - 1-3 sets - 10 reps  - Standing Hip Flexion with Counter Support  - 1-2 x daily - 7 x weekly - 1-3 sets - 10 reps  - Standing Hip Abduction with Counter Support  - 1-2 x daily - 7 x weekly - 1-3 sets - 10 reps  - Standing Hip Extension with Counter Support  - 1-2 x daily - 7 x weekly - 1-3 sets - 10 reps  - Standing March with Counter Support  - 1-2 x daily - 7 x weekly - 1-3 sets - 10 reps  - Standing Knee Flexion with Counter Support  - 1-2 x daily - 7 x weekly - 1-3 sets - 10 reps  - Mini Squat with Counter Support  - 1-2 x daily - 7 x weekly - 1-3 sets - 10 reps    OP Education:    Reinforced education on obtaining a rollator or FWW for safety with ambulation.    Assessment:     Pt's response to treatment:  Fair - The pts squat form continues to improve, but cues are needed intermittently to maintain proper form. The pt struggles to balance on uneven surfaces, most notably when visual input is reduced.   Areas of improvements:  initiated standing strengthening  Limitations/deficits:  SOB    Pain end of session: 0/10    Plan:  OP PT Plan  Treatment/Interventions: Cryotherapy, Education/ Instruction, Gait training, Hot pack, Manual therapy, Neuromuscular re-education, Therapeutic activities, Therapeutic exercises  PT Plan: Skilled PT  PT Frequency: 1 time per week  Duration: 10 visits  Onset Date: 10/03/24  Certification Period Start Date: 10/03/24  Certification Period End Date: 01/01/25  [place] of Treatments [place]ized[place]ARA [place]  Rehab Potential: Fair  Plan of Care Agreement: Patient  Continue with current POC/no changes    Assessment of current progress against goals:  Insufficient treatment time to assess progress  Goals:  Active       PT Problem       PT STG       Start:  10/03/24    Expected End:  11/17/24       - Pt will complete the HEP with <3 verbal cues for correction,   - Pt will demonstrate  "2pt improvement on the NPRS, allowing for improved tolerance of functional activities,   - Pt will demonstrate >4/5 MMT grading throughout LE musculature, allowing for appropriate muscle recruitment during daily activities.          PT LTG       Start:  10/03/24    Expected End:  01/01/25       - Pt will be independent in HEP & symptom management,   - Pt will demonstrate 4pt improvement on the NPRS pain scale, allowing for improved tolerance of functional activities.,   - Pt will improve 5x sit to stand to <16sec in order to demonstrate decreased risk of falls,   - Pt will demonstrate ability to ambulate at least 350' with least restrictive/no device without breaks or increases in pain in order to demonstrate improved tolerance to prolonged ambulation.  - Pt will demonstrate ability to perform 5 step ups to 8\" step leading with each LE and with 1 UE support in order to demonstrate improved ability to navigate stairs,   - Pt will demonstrate improved Tinetti to at least 24pts in order to demonstrate low fall risk.             "

## 2024-12-19 DIAGNOSIS — E04.1 NONTOXIC SINGLE THYROID NODULE: ICD-10-CM

## 2024-12-19 RX ORDER — METHIMAZOLE 5 MG/1
5 TABLET ORAL DAILY
Qty: 90 TABLET | Refills: 1 | Status: SHIPPED | OUTPATIENT
Start: 2024-12-19

## 2024-12-20 ENCOUNTER — LAB (OUTPATIENT)
Dept: LAB | Facility: LAB | Age: 74
End: 2024-12-20
Payer: MEDICARE

## 2024-12-20 DIAGNOSIS — E11.9 TYPE 2 DIABETES MELLITUS WITHOUT COMPLICATIONS (MULTI): Primary | ICD-10-CM

## 2024-12-20 DIAGNOSIS — E05.90 SUBCLINICAL HYPERTHYROIDISM: ICD-10-CM

## 2024-12-20 LAB — TSH SERPL-ACNC: 1.06 MIU/L (ref 0.44–3.98)

## 2024-12-20 PROCEDURE — 84443 ASSAY THYROID STIM HORMONE: CPT

## 2024-12-20 PROCEDURE — 36415 COLL VENOUS BLD VENIPUNCTURE: CPT

## 2024-12-26 ENCOUNTER — TELEPHONE (OUTPATIENT)
Dept: ENDOCRINOLOGY | Facility: CLINIC | Age: 74
End: 2024-12-26
Payer: MEDICARE

## 2024-12-30 ENCOUNTER — APPOINTMENT (OUTPATIENT)
Dept: PHYSICAL THERAPY | Facility: CLINIC | Age: 74
End: 2024-12-30
Payer: MEDICARE

## 2024-12-30 NOTE — PROGRESS NOTES
"    Physical Therapy Treatment    Patient Name: Maia Gunderson  MRN: [id number]  Today's Date: 12/29/2024             Visit Number:  6 (including evaluation)  Planned total visits: 10  Visit Authorized/ Insurance Considerations:  MEDICARE A/B, [place] assisted[place], [place] AUTH ( $0 USED PT/ST)     Current Problem  Problem List Items Addressed This Visit    None        Precautions       Pain       Subjective/General:      ***    Objective  Pt ambulates into the clinic indep, but utilizes the wall to hold onto to maintain balance.   Fair to poor balance on unven surface with EC    Treatments:  Ther-ex:  - Nustep (seat 7) L2 x7mins  Standing at bar with UE support PRN x15 reps ea - cues for form and diaphragmatic breathing throughout  - Minisquat - max verbal and tactile cues for form x12  - Heel and toe raises x10 ea  - Sit to stand - no Ues x10     Neuro Re-ed:  Flat surface:  Romberg on airex  EO/EC x30\" ea  Tandem stance on Airex x30\" leading with ea LE  Airex step ups x10 leaing with ea LE - 1 fingertip support    Airex marches x20   Airex neutral stance with head turns x10     Current HEP:  Access Code: [email or web address]  URL: [email or web address]  Date: 10/30/2024  Prepared by: Susan Nguyen    Program Notes  Perform at the kitchen sink with a dining chair next to or behind you. Rest as needed throughout the program.    Exercises  - Heel Raises with Counter Support  - 1-2 x daily - 7 x weekly - 1-3 sets - 10 reps  - Heel Toe Raises with Counter Support  - 1-2 x daily - 7 x weekly - 1-3 sets - 10 reps  - Standing Hip Flexion with Counter Support  - 1-2 x daily - 7 x weekly - 1-3 sets - 10 reps  - Standing Hip Abduction with Counter Support  - 1-2 x daily - 7 x weekly - 1-3 sets - 10 reps  - Standing Hip Extension with Counter Support  - 1-2 x daily - 7 x weekly - 1-3 sets - 10 reps  - Standing March with Counter Support  - 1-2 x daily - 7 x weekly - 1-3 sets - 10 reps  - Standing Knee Flexion with Counter Support " " - 1-2 x daily - 7 x weekly - 1-3 sets - 10 reps  - Mini Squat with Counter Support  - 1-2 x daily - 7 x weekly - 1-3 sets - 10 reps    OP Education:    Reinforced education on obtaining a rollator or FWW for safety with ambulation.    Assessment:     Pt's response to treatment:  Fair - ***  Areas of improvements:  initiated standing strengthening  Limitations/deficits:  SOB    Pain end of session:***/10    Plan:     Continue with current POC/no changes    Assessment of current progress against goals:  Insufficient treatment time to assess progress  Goals:  Active       PT Problem       PT STG       Start:  10/03/24    Expected End:  11/17/24       - Pt will complete the HEP with <3 verbal cues for correction,   - Pt will demonstrate 2pt improvement on the NPRS, allowing for improved tolerance of functional activities,   - Pt will demonstrate >4/5 MMT grading throughout LE musculature, allowing for appropriate muscle recruitment during daily activities.          PT LTG       Start:  10/03/24    Expected End:  01/01/25       - Pt will be independent in HEP & symptom management,   - Pt will demonstrate 4pt improvement on the NPRS pain scale, allowing for improved tolerance of functional activities.,   - Pt will improve 5x sit to stand to <16sec in order to demonstrate decreased risk of falls,   - Pt will demonstrate ability to ambulate at least 350' with least restrictive/no device without breaks or increases in pain in order to demonstrate improved tolerance to prolonged ambulation.  - Pt will demonstrate ability to perform 5 step ups to 8\" step leading with each LE and with 1 UE support in order to demonstrate improved ability to navigate stairs,   - Pt will demonstrate improved Tinetti to at least 24pts in order to demonstrate low fall risk.             "

## 2025-01-03 ENCOUNTER — TREATMENT (OUTPATIENT)
Dept: PHYSICAL THERAPY | Facility: CLINIC | Age: 75
End: 2025-01-03
Payer: MEDICARE

## 2025-01-03 DIAGNOSIS — R29.6 REPEATED FALLS: Primary | ICD-10-CM

## 2025-01-03 DIAGNOSIS — R26.89 OTHER ABNORMALITIES OF GAIT AND MOBILITY: ICD-10-CM

## 2025-01-03 PROCEDURE — 97112 NEUROMUSCULAR REEDUCATION: CPT | Mod: GP

## 2025-01-03 PROCEDURE — 97110 THERAPEUTIC EXERCISES: CPT | Mod: GP

## 2025-01-03 ASSESSMENT — PAIN SCALES - GENERAL: PAINLEVEL_OUTOF10: 0 - NO PAIN

## 2025-01-03 NOTE — PROGRESS NOTES
"    Physical Therapy Treatment    Patient Name: Maia Gunderson  MRN: [id number]  Today's Date: 1/3/2025  Time Calculation  Start Time: 1415  Stop Time: 1458  Time Calculation (min): 43 min     PT Therapeutic Procedures Time Entry  Neuromuscular Re-Education Time Entry: 23  Therapeutic Exercise Time Entry: 20    Visit Number:  6 (including evaluation)  Planned total visits: 10  Visit Authorized/ Insurance Considerations:  MEDICARE A/B, [facility], [place], NO AUTH ( $0 USED PT/ST)     Current Problem  Problem List Items Addressed This Visit    None    Precautions  Precautions  Medical Precautions: Fall precautions    Pain  0-10 (Numeric) Pain Score: 0 - No pain    Subjective/General:  Reason for Referral: Repeated falls  Referred By: Jersey Bowers   The pt returns stating that she is not having pain, but is feeling very tired today.     HEP compliance: yes    Objective  Pt ambulates into the clinic indep, but utilizes the wall to hold onto to maintain balance.   Fair to poor balance on unven surface with EC    Treatments:  Ther-ex:  - Nustep (seat 7) L2 x7mins  Standing at bar with UE support PRN x15 reps ea - cues for form and diaphragmatic breathing throughout  - Minisquat - max verbal and tactile cues for form x12  - Heel and toe raises x10 ea  - Sit to stand - no Ues x10     Neuro Re-ed:  Airex step ups x10 leaing with ea LE   Romberg on airex  EO/EC x30\" ea  Tandem stance on Airex x30\" leading with ea LE  Airex marches x20   Airex neutral stance with head turns x10     Current HEP:  Access Code: [email or web address]  URL: [email or web address]  Date: 10/30/2024  Prepared by: Susan Nguyen    Program Notes  Perform at the kitchen sink with a dining chair next to or behind you. Rest as needed throughout the program.    Exercises  - Heel Raises with Counter Support  - 1-2 x daily - 7 x weekly - 1-3 sets - 10 reps  - Heel Toe Raises with Counter Support  - 1-2 x daily - 7 x weekly - 1-3 sets - 10 reps  - Standing " Hip Flexion with Counter Support  - 1-2 x daily - 7 x weekly - 1-3 sets - 10 reps  - Standing Hip Abduction with Counter Support  - 1-2 x daily - 7 x weekly - 1-3 sets - 10 reps  - Standing Hip Extension with Counter Support  - 1-2 x daily - 7 x weekly - 1-3 sets - 10 reps  - Standing March with Counter Support  - 1-2 x daily - 7 x weekly - 1-3 sets - 10 reps  - Standing Knee Flexion with Counter Support  - 1-2 x daily - 7 x weekly - 1-3 sets - 10 reps  - Mini Squat with Counter Support  - 1-2 x daily - 7 x weekly - 1-3 sets - 10 reps    OP Education:    Reinforced education on obtaining a rollator or FWW for safety with ambulation.    Assessment:     Pt's response to treatment:  Fair - The pt had increased difficulty with all exercises this date d/t heightened level of fatigue. Requiring more seated rest breaks and increased time for each exercise. The pt struggles to lead step ups with the L LE, demoing increased instability.   Areas of improvements:  initiated standing strengthening  Limitations/deficits:  SOB    Pain end of session: 0/10    Plan:  OP PT Plan  Treatment/Interventions: Cryotherapy, Education/ Instruction, Gait training, Hot pack, Manual therapy, Neuromuscular re-education, Therapeutic activities, Therapeutic exercises  PT Plan: Skilled PT  PT Frequency: 1 time per week  Duration: 10 visits  Onset Date: 10/03/24  Certification Period Start Date: 10/03/24  Certification Period End Date: 01/01/25  Number of Treatments Authorized: [place] cap  Rehab Potential: Fair  Plan of Care Agreement: Patient  Continue with current POC/no changes extended POC d/t missed visits during the duration of the current episode of care.     Assessment of current progress against goals:  Insufficient treatment time to assess progress  Goals:  Active       PT Problem       PT STG       Start:  10/03/24    Expected End:  11/17/24       - Pt will complete the HEP with <3 verbal cues for correction,   - Pt will demonstrate 2pt  "improvement on the NPRS, allowing for improved tolerance of functional activities,   - Pt will demonstrate >4/5 MMT grading throughout LE musculature, allowing for appropriate muscle recruitment during daily activities.          PT LTG       Start:  10/03/24    Expected End:  01/01/25       - Pt will be independent in HEP & symptom management,   - Pt will demonstrate 4pt improvement on the NPRS pain scale, allowing for improved tolerance of functional activities.,   - Pt will improve 5x sit to stand to <16sec in order to demonstrate decreased risk of falls,   - Pt will demonstrate ability to ambulate at least 350' with least restrictive/no device without breaks or increases in pain in order to demonstrate improved tolerance to prolonged ambulation.  - Pt will demonstrate ability to perform 5 step ups to 8\" step leading with each LE and with 1 UE support in order to demonstrate improved ability to navigate stairs,   - Pt will demonstrate improved Tinetti to at least 24pts in order to demonstrate low fall risk.             "

## 2025-01-08 ENCOUNTER — TREATMENT (OUTPATIENT)
Dept: PHYSICAL THERAPY | Facility: CLINIC | Age: 75
End: 2025-01-08
Payer: MEDICARE

## 2025-01-08 DIAGNOSIS — R29.6 REPEATED FALLS: Primary | ICD-10-CM

## 2025-01-08 DIAGNOSIS — R26.89 OTHER ABNORMALITIES OF GAIT AND MOBILITY: ICD-10-CM

## 2025-01-08 PROCEDURE — 97110 THERAPEUTIC EXERCISES: CPT | Mod: GP

## 2025-01-08 ASSESSMENT — PAIN SCALES - GENERAL: PAINLEVEL_OUTOF10: 0 - NO PAIN

## 2025-01-08 NOTE — PROGRESS NOTES
Physical Therapy Progress Report & Treatment    Patient Name: Maia Gunderson  MRN: [id number]  Today's Date: 1/8/2025  Time Calculation  Start Time: 1017  Stop Time: 1100  Time Calculation (min): 43 min     PT Therapeutic Procedures Time Entry  Therapeutic Exercise Time Entry: 33    Visit Number:  7 (including evaluation)  Planned total visits: 10  Visit Authorized/ Insurance Considerations:  MEDICARE A/B, [facility] [place], [place] ( $0 USED PT/ST)     Current Problem  Problem List Items Addressed This Visit    None    Precautions  Precautions  Medical Precautions: Fall precautions    Pain  0-10 (Numeric) Pain Score: 0 - No pain    Subjective/General:  Reason for Referral: Repeated falls  Referred By: Jersey Bowers   The pt returns stating that she has not had any falls since last session. The pt notes that she did get a rollator, but hasn't used it because she needs her family to assemble it for her.     HEP compliance: yes    Objective     LQ AROM Range   Lumbar Flexion 50% limited   Lumbar Extension 75% limited   Lumbar R sidebend 75% limited   Lumbar L sidebend 75% limited      LE MMT L R   Hip flexion 4/5 4/5   Hip IR 4/5 4/5   Hip ER 4/5 4/5   Knee flexion 4+/5 4+/5   Knee extension 5/5 5/5   Ankle DF 5/5 5/5   Ankle PF 5/5 5/5     Tinetti  Sitting Balance: Steady, safe  Arises: Able, uses arms to help  Attempts to Arise: Able to arise, one attempt  Immediate Standing Balance (First 5 Seconds): Steady without walker or other support  Standing Balance: Narrow stance without support  Nudged: Steady without walker or other support  Eyes Closed: Steady  Turned 360 Degrees: Steadiness: Steady  Turned 360 Degrees: Continuity of Steps: Discontinuous steps  Sitting Down: Uses arms or not a smooth motion  Balance Score: 13  Initiation of Gait: No hesitancy  Step Height: R Swing Foot: Right foot does not clear floor completely with step  Step Length: R Swing Foot: Passes left stance foot  Step Height: L  Swing Foot: Left foot complete clears floor  Step Length: L Swing Foot: Passes right stance foot  Step Symmetry: Right and left step appear equal  Step Continuity: Steps appear continuous  Path: Mild/moderate deviation or uses walking aid  Trunk: No sway but flexion of knees or back or spreads arms out while walking  Walking Time: Heels almost touching while walking  Gait Score: 9  Total Score: 22  Other Measures  5x Sit to Stand: 24.6  Activities - Specific Balance Confidence Scale: 48.75      Treatments:  Ther-ex:  - Nustep (seat 7) L2 x7mins  Standing at bar with UE support PRN x15 reps ea - cues for form and diaphragmatic breathing throughout  - Hip 3 way ea - emphasis on slow controlled motion  - March   - Hamstring curls  - Minisquat - max verbal and tactile cues for form x10  - Heel and toe raises   - Sit to stand - no Ues x10    Current HEP:  Access Code: [email or web address]  URL: [email or web address]  Date: 10/30/2024  Prepared by: Susan Nguyen    Program Notes  Perform at the kitchen sink with a dining chair next to or behind you. Rest as needed throughout the program.    Exercises  - Heel Raises with Counter Support  - 1-2 x daily - 7 x weekly - 1-3 sets - 10 reps  - Heel Toe Raises with Counter Support  - 1-2 x daily - 7 x weekly - 1-3 sets - 10 reps  - Standing Hip Flexion with Counter Support  - 1-2 x daily - 7 x weekly - 1-3 sets - 10 reps  - Standing Hip Abduction with Counter Support  - 1-2 x daily - 7 x weekly - 1-3 sets - 10 reps  - Standing Hip Extension with Counter Support  - 1-2 x daily - 7 x weekly - 1-3 sets - 10 reps  - Standing March with Counter Support  - 1-2 x daily - 7 x weekly - 1-3 sets - 10 reps  - Standing Knee Flexion with Counter Support  - 1-2 x daily - 7 x weekly - 1-3 sets - 10 reps  - Mini Squat with Counter Support  - 1-2 x daily - 7 x weekly - 1-3 sets - 10 reps    OP Education:    Reinforced education on using a rollator for safety with ambulation.    Assessment:   Pt has  completed 7 PT sessions to address concerns about repeated falls and decreased function. The pt has not made significant progress up to this point. The pt requests a 30 day hold to trial self management during the cold weather.  Emphasis was placed on the importance of the pt utilizing an assistive device in order to maintain safety with ambulation and reduce risk of falls.   Pt's response to treatment:  Fair - The pt continues to be limited by SOB and requires intermittent seated rest to catch her breath.  Areas of improvements:  initiated standing strengthening  Limitations/deficits:  SOB    Pain end of session: 0/10    Plan:  OP PT Plan  Treatment/Interventions: Cryotherapy, Education/ Instruction, Gait training, Hot pack, Manual therapy, Neuromuscular re-education, Therapeutic activities, Therapeutic exercises  PT Plan: Skilled PT  PT Frequency: 1 time per week  Duration: 10 visits  Onset Date: 10/03/24  Certification Period Start Date: 10/03/24  Certification Period End Date: 01/01/25  Number of Treatments Authorized: [place] cap  Rehab Potential: Fair  Plan of Care Agreement: Patient  Hold 30 days.  3 visit remaining     Assessment of current progress against goals:  No significant change    Goals:  Active       PT Problem       PT STG (Not Progressing)       Start:  10/03/24    Expected End:  11/17/24       - Pt will complete the HEP with <3 verbal cues for correction,   - Pt will demonstrate 2pt improvement on the NPRS, allowing for improved tolerance of functional activities,   - Pt will demonstrate >4/5 MMT grading throughout LE musculature, allowing for appropriate muscle recruitment during daily activities.          PT LTG (Not Progressing)       Start:  10/03/24    Expected End:  01/01/25       - Pt will be independent in HEP & symptom management,   - Pt will demonstrate 4pt improvement on the NPRS pain scale, allowing for improved tolerance of functional activities.,   - Pt will improve 5x sit to stand  "to <16sec in order to demonstrate decreased risk of falls,   - Pt will demonstrate ability to ambulate at least 350' with least restrictive/no device without breaks or increases in pain in order to demonstrate improved tolerance to prolonged ambulation.  - Pt will demonstrate ability to perform 5 step ups to 8\" step leading with each LE and with 1 UE support in order to demonstrate improved ability to navigate stairs,   - Pt will demonstrate improved Tinetti to at least 24pts in order to demonstrate low fall risk.             "

## 2025-01-17 ENCOUNTER — LAB (OUTPATIENT)
Dept: LAB | Facility: LAB | Age: 75
End: 2025-01-17
Payer: MEDICARE

## 2025-01-17 DIAGNOSIS — E78.2 MIXED HYPERLIPIDEMIA: Primary | ICD-10-CM

## 2025-01-17 DIAGNOSIS — E11.9 TYPE 2 DIABETES MELLITUS WITHOUT COMPLICATIONS (MULTI): ICD-10-CM

## 2025-01-17 LAB
ANION GAP SERPL CALCULATED.3IONS-SCNC: 14 MMOL/L (ref 10–20)
BUN SERPL-MCNC: 10 MG/DL (ref 6–23)
CALCIUM SERPL-MCNC: 8.9 MG/DL (ref 8.6–10.3)
CHLORIDE SERPL-SCNC: 107 MMOL/L (ref 98–107)
CO2 SERPL-SCNC: 23 MMOL/L (ref 21–32)
CREAT SERPL-MCNC: 0.77 MG/DL (ref 0.5–1.05)
CREAT UR-MCNC: 59.8 MG/DL (ref 20–320)
EGFRCR SERPLBLD CKD-EPI 2021: 80 ML/MIN/1.73M*2
GLUCOSE SERPL-MCNC: 129 MG/DL (ref 74–99)
LDLC SERPL DIRECT ASSAY-MCNC: 54 MG/DL (ref 0–129)
MICROALBUMIN UR-MCNC: 81.1 MG/L
MICROALBUMIN/CREAT UR: 135.6 UG/MG CREAT
POTASSIUM SERPL-SCNC: 3.8 MMOL/L (ref 3.5–5.3)
SODIUM SERPL-SCNC: 140 MMOL/L (ref 136–145)

## 2025-01-17 PROCEDURE — 80048 BASIC METABOLIC PNL TOTAL CA: CPT

## 2025-01-17 PROCEDURE — 83721 ASSAY OF BLOOD LIPOPROTEIN: CPT

## 2025-01-17 PROCEDURE — 82043 UR ALBUMIN QUANTITATIVE: CPT

## 2025-01-17 PROCEDURE — 82570 ASSAY OF URINE CREATININE: CPT

## 2025-02-07 ENCOUNTER — DOCUMENTATION (OUTPATIENT)
Dept: PHYSICAL THERAPY | Facility: CLINIC | Age: 75
End: 2025-02-07
Payer: MEDICARE

## 2025-02-07 NOTE — PROGRESS NOTES
Physical Therapy    Discharge Summary    Name: Maia Gunderson  MRN: [id number]  : 1950  Date: 25    Discharge Summary: PT    Discharge Information: Date of discharge 25, Date of last visit 25, Date of evaluation 10/3/24, and Number of attended visits 7    Therapy Summary: At the pts most recent visit, she had not made any significant progress. She was placed on a 30 day hold and instructed to utilize an assistive device for safety. The pt has not scheduled any additional PT, so is being discharged at this time.    Rehab Discharge Reason: Not progressing

## 2025-02-25 ENCOUNTER — APPOINTMENT (OUTPATIENT)
Dept: CARDIOLOGY | Facility: CLINIC | Age: 75
End: 2025-02-25
Payer: MEDICARE

## 2025-03-17 ENCOUNTER — APPOINTMENT (OUTPATIENT)
Dept: VASCULAR MEDICINE | Facility: CLINIC | Age: 75
End: 2025-03-17
Payer: MEDICARE

## 2025-03-24 ENCOUNTER — APPOINTMENT (OUTPATIENT)
Dept: VASCULAR MEDICINE | Facility: CLINIC | Age: 75
End: 2025-03-24
Payer: MEDICARE

## 2025-03-25 ENCOUNTER — ANCILLARY PROCEDURE (OUTPATIENT)
Dept: VASCULAR MEDICINE | Facility: CLINIC | Age: 75
End: 2025-03-25
Payer: MEDICARE

## 2025-03-25 DIAGNOSIS — I73.9 PERIPHERAL VASCULAR DISEASE, UNSPECIFIED (CMS-HCC): ICD-10-CM

## 2025-03-25 DIAGNOSIS — I65.23 OCCLUSION AND STENOSIS OF BILATERAL CAROTID ARTERIES: ICD-10-CM

## 2025-03-25 PROCEDURE — 93880 EXTRACRANIAL BILAT STUDY: CPT

## 2025-03-25 PROCEDURE — 93923 UPR/LXTR ART STDY 3+ LVLS: CPT

## 2025-03-25 PROCEDURE — 93923 UPR/LXTR ART STDY 3+ LVLS: CPT | Performed by: INTERNAL MEDICINE

## 2025-03-25 PROCEDURE — 93880 EXTRACRANIAL BILAT STUDY: CPT | Performed by: INTERNAL MEDICINE

## 2025-04-07 ENCOUNTER — APPOINTMENT (OUTPATIENT)
Dept: CARDIOLOGY | Facility: CLINIC | Age: 75
End: 2025-04-07
Payer: MEDICARE

## 2025-04-08 ENCOUNTER — OFFICE VISIT (OUTPATIENT)
Dept: CARDIOLOGY | Facility: CLINIC | Age: 75
End: 2025-04-08
Payer: MEDICARE

## 2025-04-08 VITALS
HEART RATE: 76 BPM | OXYGEN SATURATION: 97 % | SYSTOLIC BLOOD PRESSURE: 132 MMHG | BODY MASS INDEX: 26.22 KG/M2 | DIASTOLIC BLOOD PRESSURE: 70 MMHG | RESPIRATION RATE: 16 BRPM | WEIGHT: 148 LBS

## 2025-04-08 DIAGNOSIS — I10 PRIMARY HYPERTENSION: Primary | ICD-10-CM

## 2025-04-08 DIAGNOSIS — Z95.5 PRESENCE OF STENT IN CORONARY ARTERY: ICD-10-CM

## 2025-04-08 DIAGNOSIS — R00.2 PALPITATIONS: ICD-10-CM

## 2025-04-08 DIAGNOSIS — I20.89 OTHER FORMS OF ANGINA PECTORIS: ICD-10-CM

## 2025-04-08 DIAGNOSIS — I25.10 CORONARY ARTERY DISEASE INVOLVING NATIVE CORONARY ARTERY OF NATIVE HEART WITHOUT ANGINA PECTORIS: ICD-10-CM

## 2025-04-08 DIAGNOSIS — E78.2 MIXED HYPERLIPIDEMIA: ICD-10-CM

## 2025-04-08 DIAGNOSIS — I73.9 PAD (PERIPHERAL ARTERY DISEASE) (CMS-HCC): ICD-10-CM

## 2025-04-08 DIAGNOSIS — I65.29 STENOSIS OF CAROTID ARTERY, UNSPECIFIED LATERALITY: ICD-10-CM

## 2025-04-08 DIAGNOSIS — I10 ESSENTIAL HYPERTENSION: ICD-10-CM

## 2025-04-08 PROCEDURE — 3075F SYST BP GE 130 - 139MM HG: CPT | Performed by: INTERNAL MEDICINE

## 2025-04-08 PROCEDURE — 3078F DIAST BP <80 MM HG: CPT | Performed by: INTERNAL MEDICINE

## 2025-04-08 PROCEDURE — 1126F AMNT PAIN NOTED NONE PRSNT: CPT | Performed by: INTERNAL MEDICINE

## 2025-04-08 PROCEDURE — 1036F TOBACCO NON-USER: CPT | Performed by: INTERNAL MEDICINE

## 2025-04-08 PROCEDURE — 99214 OFFICE O/P EST MOD 30 MIN: CPT | Performed by: INTERNAL MEDICINE

## 2025-04-08 PROCEDURE — 1159F MED LIST DOCD IN RCRD: CPT | Performed by: INTERNAL MEDICINE

## 2025-04-08 PROCEDURE — 1160F RVW MEDS BY RX/DR IN RCRD: CPT | Performed by: INTERNAL MEDICINE

## 2025-04-08 RX ORDER — DILTIAZEM HYDROCHLORIDE 240 MG/1
240 CAPSULE, COATED, EXTENDED RELEASE ORAL DAILY
Qty: 90 CAPSULE | Refills: 3 | Status: SHIPPED | OUTPATIENT
Start: 2025-04-08 | End: 2026-04-08

## 2025-04-08 ASSESSMENT — ENCOUNTER SYMPTOMS
DEPRESSION: 0
OCCASIONAL FEELINGS OF UNSTEADINESS: 0
LOSS OF SENSATION IN FEET: 0

## 2025-04-08 ASSESSMENT — PAIN SCALES - GENERAL: PAINLEVEL_OUTOF10: 0-NO PAIN

## 2025-04-08 ASSESSMENT — LIFESTYLE VARIABLES
AUDIT TOTAL SCORE: 0
AUDIT-C TOTAL SCORE: 0
HOW MANY STANDARD DRINKS CONTAINING ALCOHOL DO YOU HAVE ON A TYPICAL DAY: PATIENT DOES NOT DRINK
HAS A RELATIVE, FRIEND, DOCTOR, OR ANOTHER HEALTH PROFESSIONAL EXPRESSED CONCERN ABOUT YOUR DRINKING OR SUGGESTED YOU CUT DOWN: NO
HAVE YOU OR SOMEONE ELSE BEEN INJURED AS A RESULT OF YOUR DRINKING: NO
HOW OFTEN DO YOU HAVE SIX OR MORE DRINKS ON ONE OCCASION: NEVER
HOW OFTEN DO YOU HAVE A DRINK CONTAINING ALCOHOL: NEVER
SKIP TO QUESTIONS 9-10: 1

## 2025-04-08 NOTE — PROGRESS NOTES
History of present illness:  74 year old female patient here today following up on hx of CAD status post PCI to RAMUS and LCX. she had failed  of RCA at .  Cardiac catheterization on 12/22/2020 status post PCI to LCX with WENDY. 2D Echo on 01/11/2021 showed mild MR with EF of 50-55%.  Patient had stress test and echo in 2022 that overall were stable.  Patient has history also of COPD hypertension hyperlipidemia and diabetes.  Patient returns to my office for follow-up.  Has been feeling overall okay.  Denies any chest pain palpitations orthopnea or PND.  She is still complaining of shortness of breath with moderate exertion.  Has been feeling sometimes dizzy lately.      Past Medical History:   Diagnosis Date    Carotid stenosis 08/14/2018    Formatting of this note might be different from the original. Per [facility] office visit 8-13-18    Coronary artery disease involving native coronary artery of native heart without angina pectoris 10/11/2018    Diabetes mellitus type 2, controlled, without complications (Multi) 08/28/2023    DVT (deep venous thrombosis) (Multi) 08/23/2019    Formatting of this note might be different from the original. H/o DVT 2008 per [facility] office visit 8-19-19    Essential hypertension 08/28/2023    Hyperlipidemia 08/28/2023    Other forms of angina pectoris 04/15/2020    Formatting of this note might be different from the original. Per Dr. Blank office visit 4-6-20    PAD (peripheral artery disease) ([facility]) 01/15/2018    Formatting of this note might be different from the original. Per [facility] office visit 8-19-19    Palpitations 08/20/2020    Formatting of this note might be different from the original. Per Dr. Blank office visit 8-12-20    Personal history of other diseases of the circulatory system 03/11/2019    History of peripheral arterial disease    Personal history of other endocrine, nutritional and metabolic disease 03/11/2019    History of diabetes mellitus    Personal history of  transient ischemic attack (TIA), and cerebral infarction without residual deficits 02/19/2019    History of cerebrovascular accident    Presence of stent in coronary artery 08/28/2023    Pure hypercholesterolemia 08/23/2019    Formatting of this note might be different from the original. Per [facility] office visit 8-19-19    Pure hyperglyceridemia 04/01/2019    SOB (shortness of breath) 01/08/2018    Formatting of this note might be different from the original. Will schedule Lexiscan Myoview to evaluate CAD and angina symptoms. (patient unable to perform exercise myoview due to SOB) per Dr. Blank office visit 12/19/17    Stage 1 mild COPD by GOLD classification (Multi) 08/23/2019    Formatting of this note might be different from the original. Per [facility] office visit 8-19-19       Past Surgical History:   Procedure Laterality Date    BI STEREOTACTIC GUIDED BREAST LEFT LOCALIZATION AND BIOPSY Left 12/16/2020    BI STEREOTACTIC GUIDED BREAST LOCALIZATION AND BIOPSY LEFT [facility]    BI US GUIDED BREAST LOCALIZATION AND BIOPSY LEFT Left 11/17/2016    BI US GUIDED BREAST LOCALIZATION AND BIOPSY LEFT [facility]    BREAST BIOPSY Left     CATARACT EXTRACTION W/  INTRAOCULAR LENS IMPLANT Bilateral     OTHER SURGICAL HISTORY  02/19/2019    Carpal tunnel surgery    OTHER SURGICAL HISTORY  02/19/2019    Back surgery    OTHER SURGICAL HISTORY  02/19/2019    [place] filter placement    OTHER SURGICAL HISTORY  02/19/2019    Elbow surgery    OTHER SURGICAL HISTORY  02/19/2019    Internal carotid thromboendarterectomy    OTHER SURGICAL HISTORY  02/19/2019    Arterial stent placement       Allergies   Allergen Reactions    Metoprolol Shortness of breath and Unknown    Other Other    Albumin Products Unknown    Haemophilus Influenzae Type B Unknown    Hydrochlorothiazide Unknown    Latex Unknown and Hives    Nicotine Unknown and Hives     Nicotine patches    Pollen Extracts Other     Congestion, itchy eyes, runny  nose    Adhesive Tape-Silicones Hives    Flu Vaccine 2012-13(3 Yr+)(Pf) Other    Valsartan Other        reports that she quit smoking about 17 years ago. Her smoking use included cigarettes. She has never been exposed to tobacco smoke. She has never used smokeless tobacco. She reports that she does not currently use alcohol. She reports that she does not currently use drugs.    Family History   Problem Relation Name Age of Onset    Emphysema Mother      Heart disease Mother      Stroke Mother      Asthma Mother      Brain cancer Father      Liver cancer Father      Skin cancer Father      Lung cancer Father      Cancer Father      Hypertension Brother      Diabetes Brother      Watts's esophagus Brother      No Known Problems Brother      Heart disease Maternal Grandmother      Breast cancer Maternal Grandmother      Cancer Maternal Grandfather         Patient's Medications   New Prescriptions    No medications on file   Previous Medications    ACETAMINOPHEN (TYLENOL) 500 MG CAPSULE    1 capsule as needed Orally every 8 hours    ALBUTEROL (PROVENTIL HFA) 90 MCG/ACTUATION INHALER    2 puffs Inhaler every 4 hours    ASPIRIN 81 MG EC TABLET    1 tablet Orally Once a day    ATORVASTATIN (LIPITOR) 80 MG TABLET    1 tablet Orally Once a day for 30 day(s)    BUDESONIDE-GLYCOPYR-FORMOTEROL (BREZTRI AEROSPHERE) 160-9-4.8 MCG/ACTUATION HFA AEROSOL INHALER    Inhale 2 puffs twice a day.    CALCIUM CARBONATE-SIMETHICONE (MAALOX ADVANCED) 1,000-60 MG TABLET,CHEWABLE    Chew 1 tablet every 6 hours if needed.    CALCIUM CARBONATE-VITAMIN D3 (CALTRATE WITH VITAMIN D3) 600 MG-20 MCG (800 UNIT) TABLET    Take 1 tablet daily    CALCIUM CARBONATE/VITAMIN D2 (CALCIUM CARBONATE-VITAMIN D PO)    Calcium Carbonate-Vitamin D    CARVEDILOL (COREG) 3.125 MG TABLET    Take 2 tablets (6.25 mg) by mouth 2 times a day. Take with food    CETIRIZINE HCL (ZYRTEC ORAL)    Zyrtec    CLOPIDOGREL (PLAVIX) 75 MG TABLET    1 tablet Orally Once a day     COENZYME Q-10 10 MG CAPSULE    TAKE 1 CAPSULE 2-3 TIMES DAILY AS DIRECTED.    DILTIAZEM CD (CARTIA XT) 240 MG 24 HR CAPSULE    1 capsule Orally Once a day for 90    EMPAGLIFLOZIN (JARDIANCE) 10 MG    1 tablet Orally Once a day for 30 day(s)    FEXOFENADINE HCL (ALLEGRA ORAL)    TAKE 1 CAPSULE TWICE DAILY AS NEEDED.    FLUTICASONE FUROATE-VILANTEROL (BREO ELLIPTA) 200-25 MCG/DOSE INHALER    Inhalation Once a day    FLUTICASONE PROPIONATE (FLONASE NASL)    Flonase    FUROSEMIDE (LASIX) 20 MG TABLET    TAKE 1 TABLET DAILY.    GABAPENTIN (NEURONTIN) 300 MG CAPSULE    Take 1 capsule by mouth three times daily for 90 days.    GLIMEPIRIDE (AMARYL) 1 MG TABLET    TAKE 1 TABLET BY MOUTH EVERY DAY WITH BREAKFAST    ISOSORBIDE MONONITRATE ER (IMDUR) 120 MG 24 HR TABLET    1 tablet in the morning Orally Once a day for 90    METFORMIN (GLUCOPHAGE) 500 MG TABLET    1 tablet with meals Orally Twice a day    METHIMAZOLE (TAPAZOLE) 5 MG TABLET    TAKE 1 TABLET BY MOUTH EVERY DAY    NITROFURANTOIN, MACROCRYSTAL-MONOHYDRATE, (MACROBID) 100 MG CAPSULE    1 capsule with food Orally every 12 hrs    NITROGLYCERIN (NITROSTAT) 0.4 MG SL TABLET    1 tablet Sublingual prn for chest pain for 30 days    NUTRITIONAL SUPPLEMENTS ORAL    Azo- taking twice a day    OMEGA-3 FATTY ACIDS-FISH OIL (FISH OIL) 300-500 MG CAPSULE    1 capsule Orally daily    PANTOPRAZOLE (PROTONIX) 40 MG EC TABLET    1 tablet Orally Once a day for 30 day(s)    POTASSIUM CHLORIDE CR (KLOR-CON M20) 20 MEQ ER TABLET    TAKE 1 TABLET BY MOUTH TWICE A DAY    RED YEAST RICE 600 MG TABLET    TAKE TABLET    ROSUVASTATIN (CRESTOR) 40 MG TABLET    Take 1 tablet (40 mg) by mouth once daily.    TAMSULOSIN (FLOMAX) 0.4 MG 24 HR CAPSULE    Take 1 capsule (0.4 mg) by mouth once daily.    TRIAMCINOLONE (KENALOG) 0.1 % CREAM    Kenalog Cream 0.1%   Modified Medications    No medications on file   Discontinued Medications    No medications on file       Objective   Physical Exam  General:  Patient in no acute distress   HEENT: Atraumatic normocephalic.  Neck: Supple, jugular venous pressure within normal limit.  No bruits  Lungs: Clear to auscultation bilaterally  Cardiovascular: Regular rate and rhythm, normal heart sounds, no murmurs rubs or gallops  Abdomen: Soft nontender nondistended.  Normal bowel sounds.  Extremities: Warm to touch, no edema.    Lab Review   No visits with results within 2 Month(s) from this visit.   Latest known visit with results is:   Lab on 01/17/2025   Component Date Value    Albumin, Urine Random 01/17/2025 81.1     Creatinine, Urine Random 01/17/2025 59.8     Albumin/Creatinine Ratio 01/17/2025 135.6 (H)     LDL, Direct 01/17/2025 54     Glucose 01/17/2025 129 (H)     Sodium 01/17/2025 140     Potassium 01/17/2025 3.8     Chloride 01/17/2025 107     Bicarbonate 01/17/2025 23     Anion Gap 01/17/2025 14     Urea Nitrogen 01/17/2025 10     Creatinine 01/17/2025 0.77     eGFR 01/17/2025 80     Calcium 01/17/2025 8.9         Assessment/Plan   Patient Active Problem List   Diagnosis    Arthritis    Coronary artery disease involving native coronary artery of native heart without angina pectoris    Other forms of angina pectoris    Stage 1 mild COPD by GOLD classification (Multi)    Diabetes (Multi)    Diabetes mellitus type 2, controlled, without complications    Essential hypertension    GERD (gastroesophageal reflux disease)    Hyperlipidemia    Nodular thyroid disease    Presence of stent in coronary artery    Subclinical hyperthyroidism    Abnormal TSH    Watts's esophagus without dysplasia    DM type 2, controlled, with complication (Multi)    DM type 2, controlled, with complication (Multi)    Carotid stenosis    DVT (deep venous thrombosis) (Multi)    Eczema    S/P arterial stent    Incomplete emptying of bladder    Lichen planus    PAD (peripheral artery disease) (CMS-HCC)    Palpitations    Primary osteoarthritis of both hips    Pure hypercholesterolemia    Pure  hyperglyceridemia    SOB (shortness of breath)    Back problem      74 year old female patient here today following up on hx of CAD status post PCI to RAMUS and LCX. she had failed  of RCA at .  Cardiac catheterization on 12/22/2020 status post PCI to LCX with WENDY. 2D Echo on 01/11/2021 showed mild MR with EF of 50-55%.  Patient had stress test and echo in 2022 that overall were stable.  Patient has history also of COPD hypertension hyperlipidemia and diabetes.  Patient returns to my office for follow-up.  Has been feeling overall okay.  Denies any chest pain palpitations orthopnea or PND.  She is still complaining of shortness of breath with moderate exertion.  Has been feeling sometimes dizzy lately.      Patient stable cardiac wise on [employer] [facility] therapy for coronary disease and the chronic angina.  She has a known chronically occluded right coronary artery with failed attempt for revascularization in the past.  Patient stable from my standpoint recommend to continue current medications.  Reviewed her labs discussed with her lifestyle modification.  Will follow-up in 6 months.      Soledad Blank MD

## 2025-05-01 ENCOUNTER — OFFICE VISIT (OUTPATIENT)
Dept: URGENT CARE | Age: 75
End: 2025-05-01
Payer: MEDICARE

## 2025-05-01 VITALS
OXYGEN SATURATION: 96 % | SYSTOLIC BLOOD PRESSURE: 133 MMHG | DIASTOLIC BLOOD PRESSURE: 64 MMHG | BODY MASS INDEX: 25.52 KG/M2 | WEIGHT: 144 LBS | TEMPERATURE: 98.2 F | RESPIRATION RATE: 22 BRPM | HEIGHT: 63 IN | HEART RATE: 78 BPM

## 2025-05-01 DIAGNOSIS — L23.5 ALLERGIC DERMATITIS DUE TO OTHER CHEMICAL PRODUCT: Primary | ICD-10-CM

## 2025-05-01 PROCEDURE — 3075F SYST BP GE 130 - 139MM HG: CPT | Performed by: SPECIALIST

## 2025-05-01 PROCEDURE — 99203 OFFICE O/P NEW LOW 30 MIN: CPT | Performed by: SPECIALIST

## 2025-05-01 PROCEDURE — 1036F TOBACCO NON-USER: CPT | Performed by: SPECIALIST

## 2025-05-01 PROCEDURE — 1159F MED LIST DOCD IN RCRD: CPT | Performed by: SPECIALIST

## 2025-05-01 PROCEDURE — 3078F DIAST BP <80 MM HG: CPT | Performed by: SPECIALIST

## 2025-05-01 ASSESSMENT — ENCOUNTER SYMPTOMS
EYE ITCHING: 0
COLOR CHANGE: 1
EYE DISCHARGE: 0
CONSTITUTIONAL NEGATIVE: 1
EYE PAIN: 0

## 2025-05-02 ENCOUNTER — APPOINTMENT (OUTPATIENT)
Dept: ENDOCRINOLOGY | Facility: CLINIC | Age: 75
End: 2025-05-02
Payer: MEDICARE

## 2025-05-02 NOTE — PROGRESS NOTES
"Subjective   Patient ID: Maia Gunderson is a 74 y.o. female. They present today with a chief complaint of Eye Problem (Redness in both eyes x 3 days.  Patient thinks she accidentally got Bacitracin ointment, Triamconolone Acetone cream and Lubriderm into her eyes while she was asleep because it was on her hands.).    History of Present Illness    Eye Problem  Associated symptoms: no discharge and no itching        Past Medical History  Allergies as of 05/01/2025 - Reviewed 05/01/2025   Allergen Reaction Noted    Metoprolol Shortness of breath and Unknown 01/10/2023    Other Other 08/28/2023    Albumin products Unknown 08/28/2023    Haemophilus influenzae type b Unknown 04/30/2024    Latex Hives and Unknown 05/11/2017    Nicotine Unknown and Hives 01/11/2021    Pollen extracts Other 05/23/2016    Adhesive tape-silicones Hives 08/28/2023    Flu vaccine 2012-13(3 yr+)(pf) Other 08/28/2023    Hydrochlorothiazide Unknown and Other 08/28/2023    Valsartan Other 01/10/2023       Prescriptions Prior to Admission[1]     Medical History[2]    Surgical History[3]     reports that she quit smoking about 17 years ago. Her smoking use included cigarettes. She has never been exposed to tobacco smoke. She has never used smokeless tobacco. She reports that she does not currently use alcohol. She reports that she does not currently use drugs.    Review of Systems  Review of Systems   Constitutional: Negative.    Eyes:  Negative for pain, discharge and itching.   Skin:  Positive for color change.                                  Objective    Vitals:    05/01/25 1911   BP: 133/64   Pulse: 78   Resp: 22   Temp: 36.8 °C (98.2 °F)   TempSrc: Oral   SpO2: 96%   Weight: 65.3 kg (144 lb)   Height: 1.6 m (5' 3\")     No LMP recorded. Patient is postmenopausal.    Physical Exam  Constitutional:       Appearance: Normal appearance.   Eyes:      General:         Right eye: No discharge.         Left eye: No discharge.      Extraocular " Movements: Extraocular movements intact.      Conjunctiva/sclera: Conjunctivae normal.      Pupils: Pupils are equal, round, and reactive to light.   Skin:     Findings: Rash present. Rash is scaling.      Comments: mild erythema and scaling of bilateral upper and lower eyelids    Neurological:      Mental Status: She is alert.         Procedures    Point of Care Test & Imaging Results from this visit  No results found for this visit on 05/01/25.   Imaging  No results found.    Cardiology, Vascular, and Other Imaging  No other imaging results found for the past 2 days      Diagnostic study results (if any) were reviewed by Erna Mejias MD.    Assessment/Plan   Allergies, medications, history, and pertinent labs/EKGs/Imaging reviewed by Erna Mejias MD.     Medical Decision Making   Contact Dermatitis of bilateral eyelids, caused by either Bacitracin or Lubriderm, Patient has been seen by her PCPand has prescribed Triamcinolon, but thinks it makes it worse, I advised her to continue Tramcinolon.                                                                                                                                                                                                                                                                                                                                                                                          Orders and Diagnoses  There are no diagnoses linked to this encounter.    Medical Admin Record      Patient disposition: Home    Electronically signed by Erna Mejias MD  8:13 PM           [1] (Not in a hospital admission)  [2]   Past Medical History:  Diagnosis Date    Carotid stenosis 08/14/2018    Formatting of this note might be different from the original. Per [facility] office visit 8-13-18    Coronary artery disease involving native coronary artery of native heart without angina pectoris 10/11/2018    Diabetes mellitus type 2, controlled,  without complications 08/28/2023    DVT (deep venous thrombosis) (Multi) 08/23/2019    Formatting of this note might be different from the original. H/o DVT 2008 per [facility] office visit 8-19-19    Essential hypertension 08/28/2023    Hyperlipidemia 08/28/2023    Other forms of angina pectoris 04/15/2020    Formatting of this note might be different from the original. Per Dr. Blank office visit 4-6-20    PAD (peripheral artery disease) (CMS-HCC) 01/15/2018    Formatting of this note might be different from the original. Per [facility] office visit 8-19-19    Palpitations 08/20/2020    Formatting of this note might be different from the original. Per Dr. Blank office visit 8-12-20    Personal history of other diseases of the circulatory system 03/11/2019    History of peripheral arterial disease    Personal history of other endocrine, nutritional and metabolic disease 03/11/2019    History of diabetes mellitus    Personal history of transient ischemic attack (TIA), and cerebral infarction without residual deficits 02/19/2019    History of cerebrovascular accident    Presence of stent in coronary artery 08/28/2023    Pure hypercholesterolemia 08/23/2019    Formatting of this note might be different from the original. Per [facility] office visit 8-19-19    Pure hyperglyceridemia 04/01/2019    SOB (shortness of breath) 01/08/2018    Formatting of this note might be different from the original. Will schedule Lexiscan Myoview to evaluate CAD and angina symptoms. (patient unable to perform exercise myoview due to SOB) per Dr. Blank office visit 12/19/17    Stage 1 mild COPD by GOLD classification (Multi) 08/23/2019    Formatting of this note might be different from the original. Per [facility] office visit 8-19-19   [3]   Past Surgical History:  Procedure Laterality Date    BI STEREOTACTIC GUIDED BREAST LEFT LOCALIZATION AND BIOPSY Left 12/16/2020    BI STEREOTACTIC GUIDED BREAST LOCALIZATION AND BIOPSY LEFT [facility]    BI US  GUIDED BREAST LOCALIZATION AND BIOPSY LEFT Left 11/17/2016    BI US GUIDED BREAST LOCALIZATION AND BIOPSY LEFT [facility]    BREAST BIOPSY Left     CATARACT EXTRACTION W/  INTRAOCULAR LENS IMPLANT Bilateral     OTHER SURGICAL HISTORY  02/19/2019    Carpal tunnel surgery    OTHER SURGICAL HISTORY  02/19/2019    Back surgery    OTHER SURGICAL HISTORY  02/19/2019    [place] filter placement    OTHER SURGICAL HISTORY  02/19/2019    Elbow surgery    OTHER SURGICAL HISTORY  02/19/2019    Internal carotid thromboendarterectomy    OTHER SURGICAL HISTORY  02/19/2019    Arterial stent placement

## 2025-05-15 NOTE — PROGRESS NOTES
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          Thyroid ultrasound:   April 2022 thyroid u/s: R>L         -R 2.7cm complex nodule         -R 1.2cm complex nodule         - R 1.8cm comples nodule         -L 0.7cm solid nodule         -L 1.4cm solid nodule     Thyroid ultrasound oct 2023:  -R 2.2, R 1.3 (similar), L sub cm nodules, isthmus 1.1cm  -nodules graded TR2     Since last visit:  -on 5mg methimazole every day  -euthyroid, fullness/phlegm with swallowing, otherwise no obstructive sx                    Current Outpatient Medications:     acetaminophen (Tylenol) 500 mg capsule, 1 capsule as needed Orally every 8 hours, Disp: , Rfl:     aspirin 81 mg EC tablet, 1 tablet Orally Once a day, Disp: , Rfl:     calcium carbonate-simethicone (Maalox Advanced) 1,000-60 mg tablet,chewable, Chew 1 tablet every 6 hours if needed., Disp: , Rfl:     calcium carbonate-vitamin D3 (Caltrate with Vitamin D3) 600 mg-20 mcg (800 unit) tablet, Take 1 tablet daily, Disp: , Rfl:     calcium carbonate/vitamin D2 (CALCIUM CARBONATE-VITAMIN D PO), Calcium Carbonate-Vitamin D, Disp: , Rfl:     cetirizine HCl (ZYRTEC ORAL), , Disp: , Rfl:     clopidogrel (Plavix) 75 mg tablet, 1 tablet Orally Once a day, Disp: , Rfl:     coenzyme Q-10 10 mg capsule, TAKE 1 CAPSULE 2-3 TIMES DAILY AS DIRECTED., Disp: , Rfl:     dilTIAZem CD (Cartia XT) 240 mg 24 hr capsule, Take 1 capsule (240 mg) by mouth once daily., Disp: 90 capsule, Rfl: 3    fluticasone furoate-vilanteroL (Breo Ellipta) 200-25 mcg/dose inhaler, Inhalation Once a day, Disp: , Rfl:     isosorbide mononitrate ER (Imdur) 120 mg 24 hr tablet, 1 tablet in the morning Orally Once a day for 90, Disp: , Rfl:     metFORMIN (Glucophage) 500 mg tablet, 1 tablet with meals Orally Twice a day, Disp: , Rfl:     nitrofurantoin, macrocrystal-monohydrate, (Macrobid) 100 mg capsule, 1  capsule with food Orally every 12 hrs, Disp: , Rfl:     nitroglycerin (Nitrostat) 0.4 mg SL tablet, 1 tablet Sublingual prn for chest pain for 30 days, Disp: , Rfl:     NUTRITIONAL SUPPLEMENTS ORAL, Azo- taking twice a day, Disp: , Rfl:     omega-3 fatty acids-fish oil (Fish OiL) 300-500 mg capsule, 1 capsule Orally daily, Disp: , Rfl:     pantoprazole (ProtoNix) 40 mg EC tablet, 1 tablet Orally Once a day for 30 day(s), Disp: , Rfl:     potassium chloride CR (Klor-Con M20) 20 mEq ER tablet, TAKE 1 TABLET BY MOUTH TWICE A DAY, Disp: , Rfl:     red yeast rice 600 mg tablet, TAKE TABLET, Disp: , Rfl:     tamsulosin (Flomax) 0.4 mg 24 hr capsule, Take 1 capsule (0.4 mg) by mouth once daily., Disp: , Rfl:     triamcinolone (Kenalog) 0.1 % cream, Kenalog Cream 0.1%, Disp: , Rfl:     albuterol (Proventil HFA) 90 mcg/actuation inhaler, 2 puffs Inhaler every 4 hours (Patient not taking: Reported on 4/8/2025), Disp: , Rfl:     atorvastatin (Lipitor) 80 mg tablet, 1 tablet Orally Once a day for 30 day(s) (Patient not taking: Reported on 4/8/2025), Disp: , Rfl:     budesonide-glycopyr-formoterol (Breztri Aerosphere) 160-9-4.8 mcg/actuation HFA aerosol inhaler, Inhale 2 puffs twice a day. (Patient not taking: Reported on 4/8/2025), Disp: , Rfl:     carvedilol (Coreg) 3.125 mg tablet, Take 2 tablets (6.25 mg) by mouth 2 times a day. Take with food (Patient not taking: Reported on 4/8/2025), Disp: 360 tablet, Rfl: 3    empagliflozin (Jardiance) 10 mg, 1 tablet Orally Once a day for 30 day(s) (Patient not taking: Reported on 4/8/2025), Disp: , Rfl:     fexofenadine HCl (ALLEGRA ORAL), TAKE 1 CAPSULE TWICE DAILY AS NEEDED. (Patient not taking: Reported on 4/8/2025), Disp: , Rfl:     fluticasone propionate (FLONASE NASL), Flonase (Patient not taking: Reported on 4/8/2025), Disp: , Rfl:     furosemide (Lasix) 20 mg tablet, TAKE 1 TABLET DAILY. (Patient not taking: Reported on 4/8/2025), Disp: , Rfl:     gabapentin (Neurontin) 300 mg  capsule, Take 1 capsule by mouth three times daily for 90 days., Disp: , Rfl:     glimepiride (Amaryl) 1 mg tablet, TAKE 1 TABLET BY MOUTH EVERY DAY WITH BREAKFAST (Patient not taking: Reported on 4/8/2025), Disp: , Rfl:     methIMAzole (Tapazole) 5 mg tablet, Take 1 tablet (5 mg) by mouth once daily., Disp: 90 tablet, Rfl: 3    rosuvastatin (Crestor) 40 mg tablet, Take 1 tablet (40 mg) by mouth once daily., Disp: 90 tablet, Rfl: 3      Allergies as of 05/16/2025 - Reviewed 05/16/2025   Allergen Reaction Noted    Metoprolol Shortness of breath and Unknown 01/10/2023    Other Other 08/28/2023    Albumin products Unknown 08/28/2023    Haemophilus influenzae type b Unknown 04/30/2024    Latex Hives and Unknown 05/11/2017    Nicotine Unknown and Hives 01/11/2021    Pollen extracts Other 05/23/2016    Adhesive tape-silicones Hives 08/28/2023    Flu vaccine 2012-13(3 yr+)(pf) Other 08/28/2023    Hydrochlorothiazide Unknown and Other 08/28/2023    Valsartan Other 01/10/2023         Review of Systems   Cardiology: Lightheadedness-denies.  Chest pain-denies.  Leg edema-denies.  Palpitations-denies.  Respiratory: Cough-denies. Shortness of breath + Wheezing-denies.  Gastroenterology: Constipation-denies.  Diarrhea-denies.  Heartburn-denies.  Endocrinology: Cold intolerance-denies.  Heat intolerance-denies.  Sweats-denies.  Neurology: Headache-denies.  Tremor-denies.  Neuropathy in extremities-denies.  Psychology: Low energy-at times Irritability-denies.  Sleep disturbances-denies.      /71 (BP Location: Right arm, Patient Position: Sitting)   Pulse 82   Wt 65.3 kg (144 lb)   BMI 25.51 kg/m²       Labs:  Lab Results   Component Value Date    WBC 13.2 (H) 07/26/2024    NRBC 0.0 07/26/2024    RBC 5.01 07/26/2024    HGB 12.0 07/26/2024    HCT 40.4 07/26/2024     07/26/2024     Lab Results   Component Value Date    CALCIUM 8.9 01/17/2025    AST 12 07/26/2024    ALKPHOS 69 07/26/2024    BILITOT 0.3 07/26/2024     "PROT 7.3 07/26/2024    ALBUMIN 4.8 07/26/2024    GLOB 2.3 07/17/2023    AGR 1.9 07/17/2023     01/17/2025    K 3.8 01/17/2025     01/17/2025    CO2 23 01/17/2025    ANIONGAP 14 01/17/2025    BUN 10 01/17/2025    CREATININE 0.77 01/17/2025    UREACREAUR 18.8 07/17/2023    GLUCOSE 129 (H) 01/17/2025    ALT 10 07/26/2024    EGFR 80 01/17/2025     Lab Results   Component Value Date    CHOL 155 04/06/2021    TRIG 332 (H) 04/06/2021    HDL 33 (L) 04/06/2021    LDLCALC 56 (L) 04/06/2021     Lab Results   Component Value Date    MICROALBCREA 135.6 (H) 01/17/2025     Lab Results   Component Value Date    TSH 1.06 12/20/2024     No results found for: \"[id number]\"  Lab Results   Component Value Date    HGBA1C 7 (H) 04/21/2025         Physical Exam   General Appearance: pleasant, cooperative, no acute distress  HEENT: no chemosis, no proptosis, no lid lag, no lid retraction  Neck: no lymphadenopathy, no thyromegaly, no dominant thyroid nodules  Heart: no murmurs, regular rate and rhythm, S1 and S2  Lungs: no wheezes, no rhonci, no rales  Extremities: no lower extremity swelling      Assessment/Plan   1. Subclinical hyperthyroidism (Primary)  -euthyroid on therapy  -repeat labs now, in nov 2025, and prior to May 2026 visit  -refills sent         Follow Up:  Jonna 1 year    -labs/tests/notes reviewed  -reviewed and counseled patient on medication monitoring and side effects          "

## 2025-05-16 ENCOUNTER — APPOINTMENT (OUTPATIENT)
Dept: ENDOCRINOLOGY | Facility: CLINIC | Age: 75
End: 2025-05-16
Payer: MEDICARE

## 2025-05-16 VITALS
DIASTOLIC BLOOD PRESSURE: 71 MMHG | HEART RATE: 82 BPM | SYSTOLIC BLOOD PRESSURE: 126 MMHG | WEIGHT: 144 LBS | BODY MASS INDEX: 25.51 KG/M2

## 2025-05-16 DIAGNOSIS — E05.90 SUBCLINICAL HYPERTHYROIDISM: Primary | ICD-10-CM

## 2025-05-16 DIAGNOSIS — E04.1 NONTOXIC SINGLE THYROID NODULE: ICD-10-CM

## 2025-05-16 PROCEDURE — 99213 OFFICE O/P EST LOW 20 MIN: CPT | Performed by: INTERNAL MEDICINE

## 2025-05-16 PROCEDURE — 3078F DIAST BP <80 MM HG: CPT | Performed by: INTERNAL MEDICINE

## 2025-05-16 PROCEDURE — 1036F TOBACCO NON-USER: CPT | Performed by: INTERNAL MEDICINE

## 2025-05-16 PROCEDURE — 1126F AMNT PAIN NOTED NONE PRSNT: CPT | Performed by: INTERNAL MEDICINE

## 2025-05-16 PROCEDURE — 3074F SYST BP LT 130 MM HG: CPT | Performed by: INTERNAL MEDICINE

## 2025-05-16 RX ORDER — METHIMAZOLE 5 MG/1
5 TABLET ORAL DAILY
Qty: 90 TABLET | Refills: 3 | Status: SHIPPED | OUTPATIENT
Start: 2025-05-16

## 2025-05-16 ASSESSMENT — ENCOUNTER SYMPTOMS
LOSS OF SENSATION IN FEET: 0
OCCASIONAL FEELINGS OF UNSTEADINESS: 1
DEPRESSION: 0

## 2025-05-16 ASSESSMENT — PATIENT HEALTH QUESTIONNAIRE - PHQ9
SUM OF ALL RESPONSES TO PHQ9 QUESTIONS 1 & 2: 0
1. LITTLE INTEREST OR PLEASURE IN DOING THINGS: NOT AT ALL
2. FEELING DOWN, DEPRESSED OR HOPELESS: NOT AT ALL

## 2025-05-16 ASSESSMENT — PAIN SCALES - GENERAL: PAINLEVEL_OUTOF10: 0-NO PAIN

## 2025-05-17 LAB — TSH SERPL-ACNC: 0.61 MIU/L (ref 0.4–4.5)

## 2025-05-19 DIAGNOSIS — E78.2 MIXED HYPERLIPIDEMIA: ICD-10-CM

## 2025-05-21 RX ORDER — ROSUVASTATIN CALCIUM 40 MG/1
40 TABLET, COATED ORAL DAILY
Qty: 90 TABLET | Refills: 3 | Status: SHIPPED | OUTPATIENT
Start: 2025-05-21 | End: 2026-05-16

## 2025-07-10 DIAGNOSIS — I20.89 OTHER FORMS OF ANGINA PECTORIS: ICD-10-CM

## 2025-07-10 DIAGNOSIS — I10 ESSENTIAL HYPERTENSION: ICD-10-CM

## 2025-07-10 NOTE — TELEPHONE ENCOUNTER
Please send the attached prescription to the patient's pharmacy as soon as possible. Thank you!    Requested Prescriptions     Pending Prescriptions Disp Refills    isosorbide mononitrate ER (Imdur) 120 mg 24 hr tablet 90 tablet 3     Sig: Take 1 tablet (120 mg) by mouth once daily. Do not crush or chew.

## 2025-07-11 RX ORDER — ISOSORBIDE MONONITRATE 120 MG/1
120 TABLET, EXTENDED RELEASE ORAL DAILY
Qty: 90 TABLET | Refills: 3 | Status: SHIPPED | OUTPATIENT
Start: 2025-07-11 | End: 2026-07-11

## 2025-08-06 ENCOUNTER — HOSPITAL ENCOUNTER (OUTPATIENT)
Dept: RADIOLOGY | Facility: HOSPITAL | Age: 75
Discharge: HOME | End: 2025-08-06
Payer: MEDICARE

## 2025-08-06 DIAGNOSIS — R25.1 TREMOR, UNSPECIFIED: ICD-10-CM

## 2025-08-06 PROCEDURE — 70551 MRI BRAIN STEM W/O DYE: CPT | Performed by: RADIOLOGY

## 2025-08-06 PROCEDURE — 70551 MRI BRAIN STEM W/O DYE: CPT

## 2025-10-29 ENCOUNTER — APPOINTMENT (OUTPATIENT)
Dept: CARDIOLOGY | Facility: CLINIC | Age: 75
End: 2025-10-29
Payer: MEDICARE

## 2026-05-14 ENCOUNTER — APPOINTMENT (OUTPATIENT)
Facility: CLINIC | Age: 76
End: 2026-05-14
Payer: MEDICARE